# Patient Record
Sex: MALE | Race: WHITE | NOT HISPANIC OR LATINO | Employment: FULL TIME | URBAN - METROPOLITAN AREA
[De-identification: names, ages, dates, MRNs, and addresses within clinical notes are randomized per-mention and may not be internally consistent; named-entity substitution may affect disease eponyms.]

---

## 2017-04-19 ENCOUNTER — TELEPHONE (OUTPATIENT)
Dept: OBSTETRICS AND GYNECOLOGY | Facility: CLINIC | Age: 27
End: 2017-04-19

## 2017-04-19 DIAGNOSIS — Z31.9 INFERTILITY MANAGEMENT: Primary | ICD-10-CM

## 2017-05-10 ENCOUNTER — LAB VISIT (OUTPATIENT)
Dept: LAB | Facility: HOSPITAL | Age: 27
End: 2017-05-10
Attending: OBSTETRICS & GYNECOLOGY
Payer: COMMERCIAL

## 2017-05-10 DIAGNOSIS — Z31.9 INFERTILITY MANAGEMENT: ICD-10-CM

## 2017-05-10 PROCEDURE — 89320 SEMEN ANAL VOL/COUNT/MOT: CPT

## 2017-05-12 LAB
GERM CELLS, SEMEN: ABNORMAL
PH SMN: 8 [PH]
PMN'S/100 SPERMATAZOA: 0 %
SPECIMEN VOL SMN: 5 ML
SPERM # SMN: 300 M
SPERM # SMN: 60 M/ML
SPERM MOTILE NFR SMN: 53 %
SPERM NORM MOTILE # SMN: 6.4 M (ref 50–?)
SPERM NORM NFR SMN: 4 %
SPERM PROG NFR SMN: 51 %
SPERM SMN: ABNORMAL
VISC SMN QL: ABNORMAL
WBC # SMN: 0 M/ML

## 2017-05-15 ENCOUNTER — TELEPHONE (OUTPATIENT)
Dept: OBSTETRICS AND GYNECOLOGY | Facility: CLINIC | Age: 27
End: 2017-05-15

## 2017-05-15 DIAGNOSIS — N46.9 INFERTILITY MALE: Primary | ICD-10-CM

## 2017-05-15 NOTE — TELEPHONE ENCOUNTER
Semen analysis with decreased viscosity and low number of motile sperm.  Please notify.  We typically recommend a repeat semen analysis at this point.  Orders placed.

## 2017-05-17 NOTE — TELEPHONE ENCOUNTER
----- Message from Luz Lopez sent at 5/16/2017  3:25 PM CDT -----  Contact: pt   .Pt was returning nurse call. Pt states to leave a voicemail if he don't answer.   ..[phone] (home)

## 2017-05-17 NOTE — TELEPHONE ENCOUNTER
Spoke with pt, notified of results and recommendations. Pt verbalized understanding.  Placed at .

## 2017-05-25 ENCOUNTER — LAB VISIT (OUTPATIENT)
Dept: LAB | Facility: HOSPITAL | Age: 27
End: 2017-05-25
Attending: OBSTETRICS & GYNECOLOGY
Payer: COMMERCIAL

## 2017-05-25 DIAGNOSIS — N46.9 INFERTILITY MALE: ICD-10-CM

## 2017-05-25 PROCEDURE — 89320 SEMEN ANAL VOL/COUNT/MOT: CPT

## 2017-06-09 LAB
GERM CELLS, SEMEN: ABNORMAL
PH SMN: 8.5 [PH]
PMN'S/100 SPERMATAZOA: 1 %
SPECIMEN VOL SMN: 4 ML
SPERM # SMN: 252 M
SPERM # SMN: 63 M/ML
SPERM MOTILE NFR SMN: 76 %
SPERM NORM MOTILE # SMN: 11.5 M (ref 50–?)
SPERM NORM NFR SMN: 6 %
SPERM PROG NFR SMN: 66 %
SPERM SMN: ABNORMAL
VISC SMN QL: ABNORMAL
WBC # SMN: 0.63 M/ML

## 2017-11-13 ENCOUNTER — TELEPHONE (OUTPATIENT)
Dept: CARDIOLOGY | Facility: CLINIC | Age: 27
End: 2017-11-13

## 2017-11-13 DIAGNOSIS — Q23.1 BICUSPID AORTIC VALVE: Primary | ICD-10-CM

## 2017-11-13 NOTE — TELEPHONE ENCOUNTER
----- Message from Yoandy Briceno sent at 11/13/2017  2:25 PM CST -----  Contact: Estephania ( pt wife )   Estephania ( pt wife )  is requesting a call from nurse to discuss an order for an ecko.            Please call Estephania ( pt wife )  back at [phone]

## 2017-11-21 ENCOUNTER — CLINICAL SUPPORT (OUTPATIENT)
Dept: CARDIOLOGY | Facility: CLINIC | Age: 27
End: 2017-11-21
Payer: COMMERCIAL

## 2017-11-21 ENCOUNTER — CLINICAL SUPPORT (OUTPATIENT)
Dept: CARDIOLOGY | Facility: CLINIC | Age: 27
End: 2017-11-21
Attending: NUCLEAR MEDICINE
Payer: COMMERCIAL

## 2017-11-21 ENCOUNTER — OFFICE VISIT (OUTPATIENT)
Dept: CARDIOLOGY | Facility: CLINIC | Age: 27
End: 2017-11-21
Payer: COMMERCIAL

## 2017-11-21 VITALS
SYSTOLIC BLOOD PRESSURE: 120 MMHG | HEART RATE: 55 BPM | DIASTOLIC BLOOD PRESSURE: 62 MMHG | BODY MASS INDEX: 34.53 KG/M2 | WEIGHT: 220 LBS | HEIGHT: 67 IN

## 2017-11-21 DIAGNOSIS — Q23.1 BICUSPID AORTIC VALVE: Primary | ICD-10-CM

## 2017-11-21 DIAGNOSIS — Q23.1 BICUSPID AORTIC VALVE: ICD-10-CM

## 2017-11-21 LAB
AORTIC VALVE REGURGITATION: ABNORMAL
DIASTOLIC DYSFUNCTION: NO
ESTIMATED PA SYSTOLIC PRESSURE: 37.7
RETIRED EF AND QEF - SEE NOTES: 58 (ref 55–65)
TRICUSPID VALVE REGURGITATION: ABNORMAL

## 2017-11-21 PROCEDURE — 99999 PR PBB SHADOW E&M-EST. PATIENT-LVL III: CPT | Mod: PBBFAC,,, | Performed by: NUCLEAR MEDICINE

## 2017-11-21 PROCEDURE — 93306 TTE W/DOPPLER COMPLETE: CPT | Mod: S$GLB,,, | Performed by: NUCLEAR MEDICINE

## 2017-11-21 PROCEDURE — 99214 OFFICE O/P EST MOD 30 MIN: CPT | Mod: S$GLB,,, | Performed by: NUCLEAR MEDICINE

## 2017-11-21 PROCEDURE — 93000 ELECTROCARDIOGRAM COMPLETE: CPT | Mod: S$GLB,,, | Performed by: INTERNAL MEDICINE

## 2017-11-21 NOTE — PROGRESS NOTES
Subjective:   Patient ID:  Toni Prado is a 27 y.o. male who presents for follow-up of Valvular Heart Disease (Annual followup) and BICUSPID AORTIC VALVE      HPI CONGENITAL BICUSPID AV- MILD AR.  LAST SEEN 2 YRS AGO.- ECHO   PRESENTLY- PHYSICALLY VERY ACTIVE - PLAYING BASKET BALL,  WITHOUT ANY PROBLES  NO UNUSUAL MENDOZA. NO ORTHOPNEA OR PND  NO HX OF PALPITATIONS. NO NEAR SYNCOPE OR SYNCOPE  NO HX OF ANY TYPE OF CHEST DISCOMFORT  NO HX OF EDEMA. CALVE TENDERNESS OR INTERMITTENT CLAUDICATION  NO FOCAL CNS SYMPTOMS OR SIGNS TO SUGGEST TIA OR STROKE  NO HX OF RECURRENT FEVER OR CHILLS    Review of Systems   Constitution: Negative for chills, fever, weakness, night sweats, weight gain and weight loss.   HENT: Negative for nosebleeds.    Eyes: Negative for blurred vision, double vision and visual disturbance.   Cardiovascular: Negative for chest pain, dyspnea on exertion, irregular heartbeat, leg swelling, orthopnea, palpitations, paroxysmal nocturnal dyspnea and syncope.   Respiratory: Negative for cough, hemoptysis and wheezing.    Endocrine: Negative for polydipsia and polyuria.   Hematologic/Lymphatic: Does not bruise/bleed easily.   Skin: Negative for rash.   Musculoskeletal: Negative for joint pain, joint swelling, muscle weakness and myalgias.   Gastrointestinal: Negative for abdominal pain, hematemesis, jaundice and melena.   Genitourinary: Negative for dysuria, hematuria and nocturia.   Neurological: Negative for dizziness, focal weakness, headaches and sensory change.   Psychiatric/Behavioral: Negative for depression. The patient does not have insomnia and is not nervous/anxious.      Family History   Problem Relation Age of Onset    Hypertension Father      Past Medical History:   Diagnosis Date    Heart murmur     Stenosis     Valvular regurgitation     BAV-- MOD AR.     Current Outpatient Prescriptions on File Prior to Visit   Medication Sig Dispense Refill    [DISCONTINUED] naproxen (NAPROSYN)  500 MG tablet Take 1 tablet (500 mg total) by mouth 2 (two) times daily with meals. 20 tablet 0     No current facility-administered medications on file prior to visit.      Review of patient's allergies indicates:  No Known Allergies    Objective:     Physical Exam   Constitutional: He is oriented to person, place, and time. He appears well-developed. No distress.   HENT:   Head: Normocephalic.   Eyes: Conjunctivae are normal. Pupils are equal, round, and reactive to light.   Neck: Neck supple. No JVD present. No thyromegaly present.   Cardiovascular: Normal rate, regular rhythm, normal heart sounds and intact distal pulses.  Exam reveals no gallop and no friction rub.    No murmur heard.  Pulses:       Carotid pulses are 2+ on the right side, and 2+ on the left side.       Radial pulses are 2+ on the right side, and 2+ on the left side.        Femoral pulses are 2+ on the right side, and 2+ on the left side.       Popliteal pulses are 2+ on the right side, and 2+ on the left side.        Dorsalis pedis pulses are 2+ on the right side, and 2+ on the left side.        Posterior tibial pulses are 2+ on the right side, and 2+ on the left side.   Pulmonary/Chest: Breath sounds normal. He has no wheezes. He has no rales. He exhibits no tenderness.   Abdominal: Soft. Bowel sounds are normal. He exhibits no mass. There is no hepatosplenomegaly. There is no tenderness.   Musculoskeletal: He exhibits no edema or tenderness.        Cervical back: Normal.        Thoracic back: Normal.        Lumbar back: Normal.   Lymphadenopathy:     He has no cervical adenopathy.     He has no axillary adenopathy.        Right: No supraclavicular adenopathy present.        Left: No supraclavicular adenopathy present.   Neurological: He is alert and oriented to person, place, and time. He has normal strength. No sensory deficit. Gait normal.   Skin: Skin is warm. No cyanosis. No pallor. Nails show no clubbing.   Psychiatric: He has a normal  mood and affect. His speech is normal and behavior is normal. Cognition and memory are normal.       Assessment:     1. Bicuspid aortic valve      ASYMPTOMATIC  RECENT ECHO-  MILD INCREASE AORTIC ROOT SIZE  FROM 3.6 TO 3.9   LV EF 60%- LV CAVITY NORMAL SIZE- NO VEGETATIONS     MILD TO MODERATE AR.  Plan:     Bicuspid aortic valve    1- RETURN IN 6 MONTHS WITH ECHO

## 2018-05-24 ENCOUNTER — CLINICAL SUPPORT (OUTPATIENT)
Dept: CARDIOLOGY | Facility: CLINIC | Age: 28
End: 2018-05-24
Attending: NUCLEAR MEDICINE
Payer: COMMERCIAL

## 2018-05-24 ENCOUNTER — OFFICE VISIT (OUTPATIENT)
Dept: CARDIOLOGY | Facility: CLINIC | Age: 28
End: 2018-05-24
Payer: COMMERCIAL

## 2018-05-24 VITALS
HEIGHT: 67 IN | DIASTOLIC BLOOD PRESSURE: 60 MMHG | SYSTOLIC BLOOD PRESSURE: 132 MMHG | BODY MASS INDEX: 36.88 KG/M2 | HEART RATE: 56 BPM | WEIGHT: 235 LBS

## 2018-05-24 DIAGNOSIS — Q23.1 BICUSPID AORTIC VALVE: ICD-10-CM

## 2018-05-24 DIAGNOSIS — I35.1 NONRHEUMATIC AORTIC VALVE INSUFFICIENCY: Chronic | ICD-10-CM

## 2018-05-24 DIAGNOSIS — Q23.1 BICUSPID AORTIC VALVE: Primary | ICD-10-CM

## 2018-05-24 LAB
AORTIC VALVE REGURGITATION: NORMAL
DIASTOLIC DYSFUNCTION: NO
ESTIMATED PA SYSTOLIC PRESSURE: 26.23
MITRAL VALVE MOBILITY: NORMAL
RETIRED EF AND QEF - SEE NOTES: 60 (ref 55–65)

## 2018-05-24 PROCEDURE — 3008F BODY MASS INDEX DOCD: CPT | Mod: CPTII,S$GLB,, | Performed by: NUCLEAR MEDICINE

## 2018-05-24 PROCEDURE — 99999 PR PBB SHADOW E&M-EST. PATIENT-LVL III: CPT | Mod: PBBFAC,,, | Performed by: NUCLEAR MEDICINE

## 2018-05-24 PROCEDURE — 93000 ELECTROCARDIOGRAM COMPLETE: CPT | Mod: S$GLB,,, | Performed by: INTERNAL MEDICINE

## 2018-05-24 PROCEDURE — 93306 TTE W/DOPPLER COMPLETE: CPT | Mod: S$GLB,,, | Performed by: NUCLEAR MEDICINE

## 2018-05-24 PROCEDURE — 99214 OFFICE O/P EST MOD 30 MIN: CPT | Mod: S$GLB,,, | Performed by: NUCLEAR MEDICINE

## 2018-05-24 NOTE — PROGRESS NOTES
Subjective:   Patient ID:  Toni Prado is a 28 y.o. male who presents for follow-up of Valvular Heart Disease (Annual with Echo) and CONGENITAL BAV      HPI  CONGENITAL BICUSPID AV - MILD AR.  DOING WELL. ORDINARY AND WORK ACTIVITIES WELL TOLERATED.  PLAYING RECREATIONAL BASKET BALL   WELL TOLERATED  NO UNUSUAL MENDOZA. NO ORTHOPNEA OR PND  NO PALPITATIONS. NO CHEST DISCOMFORT  NO EDEMA. NO CALVE TENDERNESS.   NO INTERMITTENT CLAUDICATION  NO FOCAL CNS SYMPTOMS OR SIGNS TO SUGGEST TIA OR STROKE  ECG -SB 56 , OTHERWISE NORMAL    Review of Systems   Constitution: Negative for chills, fever, weakness, night sweats, weight gain and weight loss.   HENT: Negative for nosebleeds.    Eyes: Negative for blurred vision, double vision and visual disturbance.   Cardiovascular: Negative for chest pain, dyspnea on exertion, irregular heartbeat, leg swelling, orthopnea, palpitations, paroxysmal nocturnal dyspnea and syncope.   Respiratory: Negative for cough, hemoptysis and wheezing.    Endocrine: Negative for polydipsia and polyuria.   Hematologic/Lymphatic: Does not bruise/bleed easily.   Skin: Negative for rash.   Musculoskeletal: Negative for joint pain, joint swelling, muscle weakness and myalgias.   Gastrointestinal: Negative for abdominal pain, hematemesis, jaundice and melena.   Genitourinary: Negative for dysuria, hematuria and nocturia.   Neurological: Negative for dizziness, focal weakness, headaches and sensory change.   Psychiatric/Behavioral: Negative for depression. The patient does not have insomnia and is not nervous/anxious.      Family History   Problem Relation Age of Onset    Hypertension Father      Past Medical History:   Diagnosis Date    Heart murmur     Stenosis     Valvular regurgitation     BAV-- MOD AR.     No current outpatient prescriptions on file prior to visit.     No current facility-administered medications on file prior to visit.      Review of patient's allergies indicates:  No Known  Allergies    Objective:     Physical Exam   Constitutional: He is oriented to person, place, and time. He appears well-developed. No distress.   HENT:   Head: Normocephalic.   Eyes: Conjunctivae are normal. Pupils are equal, round, and reactive to light.   Neck: Neck supple. No JVD present. No thyromegaly present.   Cardiovascular: Normal rate, regular rhythm and intact distal pulses.  Exam reveals no gallop and no friction rub.    Murmur heard.   Low-pitched blowing decrescendo early diastolic murmur is present with a grade of 2/6  at the upper left sternal border, lower left sternal border radiating to the apex  Pulses:       Carotid pulses are 2+ on the right side, and 2+ on the left side.       Radial pulses are 2+ on the right side, and 2+ on the left side.        Femoral pulses are 2+ on the right side, and 2+ on the left side.       Popliteal pulses are 2+ on the right side, and 2+ on the left side.        Dorsalis pedis pulses are 2+ on the right side, and 2+ on the left side.        Posterior tibial pulses are 2+ on the right side, and 2+ on the left side.   Pulmonary/Chest: Breath sounds normal. He has no wheezes. He has no rales. He exhibits no tenderness.   Abdominal: Soft. Bowel sounds are normal. He exhibits no mass. There is no hepatosplenomegaly. There is no tenderness.   Musculoskeletal: He exhibits no edema or tenderness.        Cervical back: Normal.        Thoracic back: Normal.        Lumbar back: Normal.   Lymphadenopathy:     He has no cervical adenopathy.     He has no axillary adenopathy.        Right: No supraclavicular adenopathy present.        Left: No supraclavicular adenopathy present.   Neurological: He is alert and oriented to person, place, and time. He has normal strength. No sensory deficit. Gait normal.   Skin: Skin is warm. No cyanosis. No pallor. Nails show no clubbing.   Psychiatric: He has a normal mood and affect. His speech is normal and behavior is normal. Cognition and  memory are normal.       Assessment:     1. Bicuspid aortic valve      ASYMPTOMATIC CONGENITAL BAV/ MILD MR  ECHO TODAY- ASCENDING AORTA 4 CM  Plan:     Bicuspid aortic valve    1- CONTINUE PRESENT CARD MANAGEMENT    2- RETURN IN 6 MONTHS WITH ECHO

## 2018-12-06 ENCOUNTER — OFFICE VISIT (OUTPATIENT)
Dept: CARDIOLOGY | Facility: CLINIC | Age: 28
End: 2018-12-06
Payer: COMMERCIAL

## 2018-12-06 ENCOUNTER — CLINICAL SUPPORT (OUTPATIENT)
Dept: CARDIOLOGY | Facility: CLINIC | Age: 28
End: 2018-12-06
Attending: NUCLEAR MEDICINE
Payer: COMMERCIAL

## 2018-12-06 VITALS
DIASTOLIC BLOOD PRESSURE: 58 MMHG | HEIGHT: 67 IN | HEART RATE: 64 BPM | SYSTOLIC BLOOD PRESSURE: 120 MMHG | WEIGHT: 230 LBS | BODY MASS INDEX: 36.1 KG/M2

## 2018-12-06 DIAGNOSIS — Q23.1 BICUSPID AORTIC VALVE: ICD-10-CM

## 2018-12-06 DIAGNOSIS — I77.9 AORTIC DISEASE: ICD-10-CM

## 2018-12-06 DIAGNOSIS — Q23.1 BICUSPID AORTIC VALVE: Primary | ICD-10-CM

## 2018-12-06 DIAGNOSIS — I35.1 NONRHEUMATIC AORTIC VALVE INSUFFICIENCY: Chronic | ICD-10-CM

## 2018-12-06 DIAGNOSIS — Z01.89 ENCOUNTER FOR ROUTINE LABORATORY TESTING: Primary | ICD-10-CM

## 2018-12-06 PROCEDURE — 93306 TTE W/DOPPLER COMPLETE: CPT | Mod: S$GLB,,, | Performed by: INTERNAL MEDICINE

## 2018-12-06 PROCEDURE — 99214 OFFICE O/P EST MOD 30 MIN: CPT | Mod: S$GLB,,, | Performed by: NUCLEAR MEDICINE

## 2018-12-06 PROCEDURE — 99999 PR PBB SHADOW E&M-EST. PATIENT-LVL III: CPT | Mod: PBBFAC,,, | Performed by: NUCLEAR MEDICINE

## 2018-12-06 PROCEDURE — 3008F BODY MASS INDEX DOCD: CPT | Mod: CPTII,S$GLB,, | Performed by: NUCLEAR MEDICINE

## 2018-12-06 NOTE — PROGRESS NOTES
Subjective:   Patient ID:  Toni Prado is a 28 y.o. male who presents for follow-up of Valvular Heart Disease (BAV.)      HPI1-CONGENITAL BAV-MILD AR.  DOING WELL.NO PALPITATIONS.  NO UNUSUAL MENDOZA.NO ORTHOPNEA OR PND  NO CHEST DISCOMFORT  NO UNUSUAL FEVER OR CHILLS  NO EDEMA. NO CALVE TENDERNESS. NO INTERMITTENT CLAUDICATION  NO FOCAL CNS SYMPTOMS OR SIGNS TO SUGGEST TIA OR STROKE  ECHO DONE TODAY-  ASCENDING AORTA 4.3 CM, INCREASED FROM 4 CM 7 MONTHS AGO    Review of Systems   Constitution: Negative for chills, fever, weakness, night sweats, weight gain and weight loss.   HENT: Negative for nosebleeds.    Eyes: Negative for blurred vision, double vision and visual disturbance.   Cardiovascular: Negative for chest pain, dyspnea on exertion, irregular heartbeat, leg swelling, orthopnea, palpitations, paroxysmal nocturnal dyspnea and syncope.   Respiratory: Negative for cough, hemoptysis and wheezing.    Endocrine: Negative for polydipsia and polyuria.   Hematologic/Lymphatic: Does not bruise/bleed easily.   Skin: Negative for rash.   Musculoskeletal: Negative for joint pain, joint swelling, muscle weakness and myalgias.   Gastrointestinal: Negative for abdominal pain, hematemesis, jaundice and melena.   Genitourinary: Negative for dysuria, hematuria and nocturia.   Neurological: Negative for dizziness, focal weakness, headaches and sensory change.   Psychiatric/Behavioral: Negative for depression. The patient does not have insomnia and is not nervous/anxious.      Family History   Problem Relation Age of Onset    Hypertension Father      Past Medical History:   Diagnosis Date    Heart murmur     Stenosis     Valvular regurgitation     BAV-- MOD AR.     No current outpatient medications on file prior to visit.     No current facility-administered medications on file prior to visit.      Review of patient's allergies indicates:  No Known Allergies    Objective:     Physical Exam   Constitutional: He is  oriented to person, place, and time. He appears well-developed. No distress.   HENT:   Head: Normocephalic.   Eyes: Conjunctivae are normal. Pupils are equal, round, and reactive to light.   Neck: Neck supple. No JVD present. No thyromegaly present.   Cardiovascular: Normal rate, regular rhythm and intact distal pulses. Exam reveals no gallop and no friction rub.   Murmur heard.   Medium-pitched blowing decrescendo early diastolic murmur is present with a grade of 2/6 at the upper left sternal border and lower left sternal border. The intensity increases with respiration.  Pulses:       Carotid pulses are 2+ on the right side, and 2+ on the left side.       Radial pulses are 2+ on the right side, and 2+ on the left side.        Femoral pulses are 2+ on the right side, and 2+ on the left side.       Popliteal pulses are 2+ on the right side, and 2+ on the left side.        Dorsalis pedis pulses are 2+ on the right side, and 2+ on the left side.        Posterior tibial pulses are 2+ on the right side, and 2+ on the left side.   Pulmonary/Chest: Breath sounds normal. He has no wheezes. He has no rales. He exhibits no tenderness.   Abdominal: Soft. Bowel sounds are normal. He exhibits no mass. There is no hepatosplenomegaly. There is no tenderness.   Musculoskeletal: He exhibits no edema or tenderness.        Cervical back: Normal.        Thoracic back: Normal.        Lumbar back: Normal.   Lymphadenopathy:     He has no cervical adenopathy.     He has no axillary adenopathy.        Right: No supraclavicular adenopathy present.        Left: No supraclavicular adenopathy present.   Neurological: He is alert and oriented to person, place, and time. He has normal strength. No sensory deficit. Gait normal.   Skin: Skin is warm. No cyanosis. No pallor. Nails show no clubbing.   Psychiatric: He has a normal mood and affect. His speech is normal and behavior is normal. Cognition and memory are normal.       Assessment:     1.  Bicuspid aortic valve    2. Nonrheumatic aortic valve insufficiency      ASYMPTOMATIC  MILDLY INCREASE ASCENDING AORTA DILATATION  Plan:     Bicuspid aortic valve    Nonrheumatic aortic valve insufficiency      1- SCHEDULE CTA OF THORACIC AORTA-  EVALUATION OF ASCENDING AORTA- /BISCUSPID AORTIC VALVE    2- PHONE CALL TO REVIEW RESULTS

## 2018-12-07 ENCOUNTER — PATIENT MESSAGE (OUTPATIENT)
Dept: CARDIOLOGY | Facility: CLINIC | Age: 28
End: 2018-12-07

## 2018-12-07 ENCOUNTER — TELEPHONE (OUTPATIENT)
Dept: RADIOLOGY | Facility: HOSPITAL | Age: 28
End: 2018-12-07

## 2018-12-07 DIAGNOSIS — I77.9 AORTIC DISEASE: ICD-10-CM

## 2018-12-09 LAB
AORTIC VALVE REGURGITATION: ABNORMAL
DIASTOLIC DYSFUNCTION: NO
ESTIMATED PA SYSTOLIC PRESSURE: 25.2
RETIRED EF AND QEF - SEE NOTES: 60 (ref 55–65)

## 2018-12-10 ENCOUNTER — TELEPHONE (OUTPATIENT)
Dept: CARDIOLOGY | Facility: CLINIC | Age: 28
End: 2018-12-10

## 2018-12-10 NOTE — TELEPHONE ENCOUNTER
----- Message from Cristi Duncan sent at 12/10/2018 10:30 AM CST -----  Contact: pt   States he's calling to see if th angiogram is still needed/ states it was discussed in his appt when he was last in and can be reached at [phone].//thanks/dbw

## 2018-12-10 NOTE — TELEPHONE ENCOUNTER
Returned call. Explained CTA scheduled and to arrive 30 minutes early for labs.    Patient verbalized understanding.

## 2018-12-11 ENCOUNTER — TELEPHONE (OUTPATIENT)
Dept: CARDIOLOGY | Facility: CLINIC | Age: 28
End: 2018-12-11

## 2018-12-11 NOTE — TELEPHONE ENCOUNTER
----- Message from Tash Parra, RT sent at 12/11/2018 10:29 AM CST -----  Regarding: CT order question  CTA chest and abdomen ordered for to evaluate aorta ascending aorta. I am confirming the  wants the abdominal aorta. If not, CTA chest only needs to be ordered. Please call with any questions.   Thanks,   Tash Parra  [phone]

## 2018-12-12 ENCOUNTER — HOSPITAL ENCOUNTER (OUTPATIENT)
Dept: RADIOLOGY | Facility: HOSPITAL | Age: 28
Discharge: HOME | End: 2018-12-12
Attending: NUCLEAR MEDICINE
Payer: COMMERCIAL

## 2018-12-12 DIAGNOSIS — I77.9 AORTIC DISEASE: ICD-10-CM

## 2018-12-12 PROCEDURE — 71275 CT ANGIOGRAPHY CHEST: CPT | Mod: 26,,, | Performed by: RADIOLOGY

## 2018-12-12 PROCEDURE — 71275 CT ANGIOGRAPHY CHEST: CPT | Mod: TC,PO

## 2018-12-12 PROCEDURE — 25500020 PHARM REV CODE 255: Mod: PO | Performed by: NUCLEAR MEDICINE

## 2018-12-12 RX ADMIN — IOHEXOL 100 ML: 350 INJECTION, SOLUTION INTRAVENOUS at 11:12

## 2019-03-22 ENCOUNTER — TELEPHONE (OUTPATIENT)
Dept: CARDIOLOGY | Facility: CLINIC | Age: 29
End: 2019-03-22

## 2019-03-22 NOTE — TELEPHONE ENCOUNTER
----- Message from Joshua Lara sent at 3/22/2019  2:54 PM CDT -----  Contact: Nohemi ([employer])   Please give Nohemi a call at [phone] she is calling to check the status of the request for medical records.

## 2019-03-22 NOTE — TELEPHONE ENCOUNTER
Returned call. Requesting complete chart. Provided phone number and fax numbers to HIM, since requesting all medical records.

## 2019-03-26 ENCOUNTER — TELEPHONE (OUTPATIENT)
Dept: CARDIOLOGY | Facility: CLINIC | Age: 29
End: 2019-03-26

## 2019-03-26 NOTE — TELEPHONE ENCOUNTER
----- Message from Denise Retana sent at 3/26/2019  9:02 AM CDT -----  [phone]   Would like a nurse to contact him regarding request for dakotah's statement, please contact him @ [phone]. Thanks

## 2019-04-01 ENCOUNTER — OFFICE VISIT (OUTPATIENT)
Dept: URGENT CARE | Facility: CLINIC | Age: 29
End: 2019-04-01
Payer: COMMERCIAL

## 2019-04-01 VITALS
OXYGEN SATURATION: 100 % | SYSTOLIC BLOOD PRESSURE: 128 MMHG | DIASTOLIC BLOOD PRESSURE: 78 MMHG | HEART RATE: 97 BPM | HEIGHT: 67 IN | BODY MASS INDEX: 36.88 KG/M2 | TEMPERATURE: 99 F | WEIGHT: 235 LBS | RESPIRATION RATE: 16 BRPM

## 2019-04-01 DIAGNOSIS — J06.9 UPPER RESPIRATORY TRACT INFECTION, UNSPECIFIED TYPE: Primary | ICD-10-CM

## 2019-04-01 DIAGNOSIS — R68.89 FLU-LIKE SYMPTOMS: ICD-10-CM

## 2019-04-01 LAB
CTP QC/QA: YES
POC MOLECULAR INFLUENZA A AGN: NEGATIVE
POC MOLECULAR INFLUENZA B AGN: NEGATIVE

## 2019-04-01 PROCEDURE — 87502 INFLUENZA DNA AMP PROBE: CPT | Mod: QW,S$GLB,, | Performed by: NURSE PRACTITIONER

## 2019-04-01 PROCEDURE — 99999 PR PBB SHADOW E&M-EST. PATIENT-LVL III: ICD-10-PCS | Mod: PBBFAC,,, | Performed by: NURSE PRACTITIONER

## 2019-04-01 PROCEDURE — 99214 PR OFFICE/OUTPT VISIT, EST, LEVL IV, 30-39 MIN: ICD-10-PCS | Mod: S$GLB,,, | Performed by: NURSE PRACTITIONER

## 2019-04-01 PROCEDURE — 3008F PR BODY MASS INDEX (BMI) DOCUMENTED: ICD-10-PCS | Mod: CPTII,S$GLB,, | Performed by: NURSE PRACTITIONER

## 2019-04-01 PROCEDURE — 3008F BODY MASS INDEX DOCD: CPT | Mod: CPTII,S$GLB,, | Performed by: NURSE PRACTITIONER

## 2019-04-01 PROCEDURE — 99214 OFFICE O/P EST MOD 30 MIN: CPT | Mod: S$GLB,,, | Performed by: NURSE PRACTITIONER

## 2019-04-01 PROCEDURE — 99999 PR PBB SHADOW E&M-EST. PATIENT-LVL III: CPT | Mod: PBBFAC,,, | Performed by: NURSE PRACTITIONER

## 2019-04-01 PROCEDURE — 87502 POCT INFLUENZA A/B MOLECULAR: ICD-10-PCS | Mod: QW,S$GLB,, | Performed by: NURSE PRACTITIONER

## 2019-04-02 NOTE — PROGRESS NOTES
Subjective:      Patient ID: Toni Prado is a 29 y.o. male.    Chief Complaint: Cough (post nasal drip, scratchy throat x 2 days)      Sore Throat    This is a new problem. Episode onset: 2 days. The problem has been unchanged. There has been no fever. The pain is at a severity of 0/10. The patient is experiencing no pain. Associated symptoms include congestion, coughing and neck pain. Pertinent negatives include no abdominal pain, diarrhea, ear discharge, ear pain, headaches, shortness of breath or vomiting. Stridor:    Associated symptoms comments: Sweating, fatigue. He has had no exposure to strep or mono. Treatments tried: nyquil, dayquil, mucinex, ibuprofen.     Review of Systems   Constitutional: Positive for diaphoresis and fatigue. Negative for activity change, appetite change, chills, fever and unexpected weight change.   HENT: Positive for congestion and sore throat. Negative for ear discharge, ear pain, postnasal drip, rhinorrhea, sinus pressure, sinus pain and sneezing.    Eyes: Negative.    Respiratory: Positive for cough. Negative for chest tightness, shortness of breath and wheezing. Stridor:       Cardiovascular: Negative for chest pain and palpitations.   Gastrointestinal: Negative for abdominal pain, diarrhea, nausea and vomiting.   Endocrine: Positive for heat intolerance.   Genitourinary: Negative.    Musculoskeletal: Positive for neck pain. Negative for arthralgias and myalgias.   Skin: Negative for rash.   Allergic/Immunologic: Negative for environmental allergies and food allergies.   Neurological: Negative for dizziness, weakness, light-headedness and headaches.   Hematological: Negative for adenopathy.   Psychiatric/Behavioral: Negative for agitation.        Objective:     Vitals:    04/01/19 1901   BP: 128/78   Pulse: 97   Resp: 16   Temp: 99 °F (37.2 °C)     Physical Exam   Constitutional: He is oriented to person, place, and time. Vital signs are normal. He appears  well-developed and well-nourished. He is cooperative. No distress.   HENT:   Head: Normocephalic.   Right Ear: Hearing, external ear and ear canal normal.   Left Ear: Hearing, tympanic membrane, external ear and ear canal normal.   Nose: Nose normal. Right sinus exhibits no maxillary sinus tenderness and no frontal sinus tenderness. Left sinus exhibits no maxillary sinus tenderness and no frontal sinus tenderness.   Mouth/Throat: Uvula is midline and mucous membranes are normal. No oral lesions. No uvula swelling. Posterior oropharyngeal erythema (mild) present. No oropharyngeal exudate, posterior oropharyngeal edema or tonsillar abscesses.   Slight dull appearance to right TM   Eyes: Pupils are equal, round, and reactive to light. Conjunctivae, EOM and lids are normal. Right eye exhibits no discharge. Left eye exhibits no discharge.   Neck: Normal range of motion and full passive range of motion without pain. Neck supple.   Cardiovascular: Normal rate, regular rhythm and normal heart sounds.   Pulmonary/Chest: Effort normal and breath sounds normal. No accessory muscle usage. No tachypnea and no bradypnea. No respiratory distress.   Musculoskeletal: Normal range of motion.   Lymphadenopathy:        Head (right side): No submandibular and no tonsillar adenopathy present.        Head (left side): No submandibular and no tonsillar adenopathy present.     He has no cervical adenopathy.   Neurological: He is alert and oriented to person, place, and time.   Skin: Skin is warm and intact. Capillary refill takes less than 2 seconds. No bruising, no ecchymosis, no lesion, no petechiae and no rash noted. He is diaphoretic (mild). No erythema. No pallor.   Nursing note and vitals reviewed.      Assessment:     1. Flu-like symptoms    2. Upper respiratory tract infection, unspecified type        Plan:     Toni was seen today for cough.    Diagnoses and all orders for this visit:    Upper respiratory tract infection,  unspecified type    Flu-like symptoms  -     POCT Influenza A/B Molecular    Flu swab negative today  · Your symptoms are likely due to a viral infection. These infections can last up to 14 days, but you should notice some improvement of your symptoms within the first 7-10 days. Viral infections will not improve with antibiotics. If your symptoms persist >10 days without improvement or if you have any new or worsening symptoms, this is an indication that you may have developed a bacterial infection and should return to your primary care provider or to Urgent Care.   · Getting plenty of rest is very important to fighting infections.  · Increase fluids.   · May apply warm compresses as needed for facial pain and congestion.   · Saline nasal spray to loosen nasal congestion.  · Flonase or Nasacort to reduce inflammation in the sinus cavities.  · You may take an over the counter antihistamine for allergy symptoms such as sneezing, itchy/watery eyes, scratchy throat, or congestion.  · Take Tylenol or Ibuprofen as needed for sore throat, body aches, or fever.  · Don't drive, drink alcohol, or take any other medication or substance that causes sedation while taking cough syrup.   · Follow up with your primary care provider if symptoms persist >10 days or sooner for any new or worsening symptoms.   Go to the ER for any fever that does not improve with Tylenol/Ibuprofen, neck stiffness, rash, severe headache, vision changes, shortness of breath, chest pain, facial swelling, severe facial pain, or any other new and concerning symptoms.        KASIA Larsen, FNP-C

## 2019-04-02 NOTE — PATIENT INSTRUCTIONS
Viral Upper Respiratory Illness (Adult)  You have a viral upper respiratory illness (URI), which is another term for the common cold. This illness is contagious during the first few days. It is spread through the air by coughing and sneezing. It may also be spread by direct contact (touching the sick person and then touching your own eyes, nose, or mouth). Frequent handwashing will decrease risk of spread. Most viral illnesses go away within 7 to 10 days with rest and simple home remedies. Sometimes the illness may last for several weeks. Antibiotics will not kill a virus, and they are generally not prescribed for this condition.    Home care  · If symptoms are severe, rest at home for the first 2 to 3 days. When you resume activity, don't let yourself get too tired.  · Avoid being exposed to cigarette smoke (yours or others).  · You may use acetaminophen or ibuprofen to control pain and fever, unless another medicine was prescribed. (Note: If you have chronic liver or kidney disease, have ever had a stomach ulcer or gastrointestinal bleeding, or are taking blood-thinning medicines, talk with your healthcare provider before using these medicines.) Aspirin should never be given to anyone under 18 years of age who is ill with a viral infection or fever. It may cause severe liver or brain damage.  · Your appetite may be poor, so a light diet is fine. Avoid dehydration by drinking 6 to 8 glasses of fluids per day (water, soft drinks, juices, tea, or soup). Extra fluids will help loosen secretions in the nose and lungs.  · Over-the-counter cold medicines will not shorten the length of time youre sick, but they may be helpful for the following symptoms: cough, sore throat, and nasal and sinus congestion. (Note: Do not use decongestants if you have high blood pressure.)  Follow-up care  Follow up with your healthcare provider, or as advised.  When to seek medical advice  Call your healthcare provider right away if any  of these occur:  · Cough with lots of colored sputum (mucus)  · Severe headache; face, neck, or ear pain  · Difficulty swallowing due to throat pain  · Fever of 100.4°F (38°C)  Call 911, or get immediate medical care  Call emergency services right away if any of these occur:  · Chest pain, shortness of breath, wheezing, or difficulty breathing  · Coughing up blood  · Inability to swallow due to throat pain  Date Last Reviewed: 9/13/2015  © 6882-7271 [employer]. [address] [place] [id number]. All rights reserved. This information is not intended as a substitute for professional medical care. Always follow your healthcare professional's instructions.

## 2019-04-03 ENCOUNTER — TELEPHONE (OUTPATIENT)
Dept: CARDIOLOGY | Facility: CLINIC | Age: 29
End: 2019-04-03

## 2019-04-03 NOTE — TELEPHONE ENCOUNTER
Returned call and informed Donya request has been faxed to HIM.  Also provided a phone number and fax number to HIM. HIM awaiting fax.

## 2019-04-03 NOTE — TELEPHONE ENCOUNTER
----- Message from Elina Longoria sent at 4/3/2019  3:25 PM CDT -----  Contact: See- [employer] [facility]-[phone]    Would Like to verify if fax was received form [employer] and would like for Mrs. Andres to call back, please call back at [phone].   x-AH

## 2019-11-12 ENCOUNTER — PATIENT MESSAGE (OUTPATIENT)
Dept: CARDIOLOGY | Facility: CLINIC | Age: 29
End: 2019-11-12

## 2019-11-13 NOTE — TELEPHONE ENCOUNTER
Dr. RUVALCABA is planning on opening his schedule on 11/21 and said we can schedule [facility]izz[facility]lett[facility] on that day. I am waiting for it to be open on epic before following up with the pt.

## 2019-11-18 ENCOUNTER — TELEPHONE (OUTPATIENT)
Dept: RADIOLOGY | Facility: HOSPITAL | Age: 29
End: 2019-11-18

## 2019-11-19 ENCOUNTER — HOSPITAL ENCOUNTER (OUTPATIENT)
Dept: RADIOLOGY | Facility: HOSPITAL | Age: 29
Discharge: HOME | End: 2019-11-19
Attending: NUCLEAR MEDICINE
Payer: COMMERCIAL

## 2019-11-19 DIAGNOSIS — I35.1 NONRHEUMATIC AORTIC VALVE INSUFFICIENCY: Primary | Chronic | ICD-10-CM

## 2019-11-19 DIAGNOSIS — I77.9 AORTIC DISEASE: ICD-10-CM

## 2019-11-19 PROCEDURE — 71275 CTA CHEST NON CORONARY: ICD-10-PCS | Mod: 26,,, | Performed by: RADIOLOGY

## 2019-11-19 PROCEDURE — 71275 CT ANGIOGRAPHY CHEST: CPT | Mod: TC

## 2019-11-19 PROCEDURE — 71275 CT ANGIOGRAPHY CHEST: CPT | Mod: 26,,, | Performed by: RADIOLOGY

## 2019-11-19 PROCEDURE — 25500020 PHARM REV CODE 255: Performed by: NUCLEAR MEDICINE

## 2019-11-19 RX ADMIN — IOHEXOL 100 ML: 350 INJECTION, SOLUTION INTRAVENOUS at 01:11

## 2019-11-21 ENCOUNTER — OFFICE VISIT (OUTPATIENT)
Dept: CARDIOLOGY | Facility: CLINIC | Age: 29
End: 2019-11-21
Payer: COMMERCIAL

## 2019-11-21 ENCOUNTER — CLINICAL SUPPORT (OUTPATIENT)
Dept: CARDIOLOGY | Facility: CLINIC | Age: 29
End: 2019-11-21
Payer: COMMERCIAL

## 2019-11-21 VITALS
DIASTOLIC BLOOD PRESSURE: 64 MMHG | HEART RATE: 56 BPM | SYSTOLIC BLOOD PRESSURE: 122 MMHG | WEIGHT: 247.81 LBS | BODY MASS INDEX: 38.89 KG/M2 | HEIGHT: 67 IN

## 2019-11-21 DIAGNOSIS — I35.1 NONRHEUMATIC AORTIC VALVE INSUFFICIENCY: Chronic | ICD-10-CM

## 2019-11-21 DIAGNOSIS — I71.20 THORACIC AORTIC ANEURYSM WITHOUT RUPTURE: Chronic | ICD-10-CM

## 2019-11-21 DIAGNOSIS — Q23.1 BICUSPID AORTIC VALVE: Primary | ICD-10-CM

## 2019-11-21 PROCEDURE — 3008F BODY MASS INDEX DOCD: CPT | Mod: CPTII,S$GLB,, | Performed by: NUCLEAR MEDICINE

## 2019-11-21 PROCEDURE — 99214 OFFICE O/P EST MOD 30 MIN: CPT | Mod: S$GLB,,, | Performed by: NUCLEAR MEDICINE

## 2019-11-21 PROCEDURE — 99214 PR OFFICE/OUTPT VISIT, EST, LEVL IV, 30-39 MIN: ICD-10-PCS | Mod: S$GLB,,, | Performed by: NUCLEAR MEDICINE

## 2019-11-21 PROCEDURE — 93005 ELECTROCARDIOGRAM TRACING: CPT | Mod: S$GLB,,, | Performed by: NUCLEAR MEDICINE

## 2019-11-21 PROCEDURE — 99999 PR PBB SHADOW E&M-EST. PATIENT-LVL III: ICD-10-PCS | Mod: PBBFAC,,, | Performed by: NUCLEAR MEDICINE

## 2019-11-21 PROCEDURE — 93005 EKG 12-LEAD: ICD-10-PCS | Mod: S$GLB,,, | Performed by: NUCLEAR MEDICINE

## 2019-11-21 PROCEDURE — 99999 PR PBB SHADOW E&M-EST. PATIENT-LVL III: CPT | Mod: PBBFAC,,, | Performed by: NUCLEAR MEDICINE

## 2019-11-21 PROCEDURE — 3008F PR BODY MASS INDEX (BMI) DOCUMENTED: ICD-10-PCS | Mod: CPTII,S$GLB,, | Performed by: NUCLEAR MEDICINE

## 2019-11-21 PROCEDURE — 93010 ELECTROCARDIOGRAM REPORT: CPT | Mod: S$GLB,,, | Performed by: INTERNAL MEDICINE

## 2019-11-21 PROCEDURE — 93010 EKG 12-LEAD: ICD-10-PCS | Mod: S$GLB,,, | Performed by: INTERNAL MEDICINE

## 2019-11-21 NOTE — PROGRESS NOTES
Subjective:   Patient ID:  Toni Prado is a 29 y.o. male who presents for follow-up of Follow-up (results//annual) and Valvular Heart Disease (BAV- TAA)      HPI RECENT  CT SCAN OF CHEST WAS REVIEWED AND DISCUSSED WITH PT- TAA - 4.3 CM-UNCHANGED SINCE PREVIOUS STUDY 12/200 8  DOING WELL. NO CHEST OR BACK DISCOMFORT  NO UNUSUAL MENDOZA. NO ORTHOPNEA OR PND  NO PALPITATIONS  NO NEAR SYNCOPE OR SYNCOPE  NO EDEMA. NO CALVE TENDERNESS  NO FOCAL CNS SYMPTOMS OR SIGNS TO SUGGEST TIA OR STROKE  NO ABDOMINAL PULSATIONS OR PAIN  ECG TODAY, SB 57 NO ACUTE ST T CHANGES    Review of Systems   Constitution: Negative for chills, fever, night sweats, weight gain and weight loss.   HENT: Negative for nosebleeds.    Eyes: Negative for blurred vision, double vision and visual disturbance.   Cardiovascular: Negative for chest pain, dyspnea on exertion, irregular heartbeat, leg swelling, orthopnea, palpitations, paroxysmal nocturnal dyspnea and syncope.   Respiratory: Negative for cough, hemoptysis and wheezing.    Endocrine: Negative for polydipsia and polyuria.   Hematologic/Lymphatic: Does not bruise/bleed easily.   Skin: Negative for rash.   Musculoskeletal: Negative for joint pain, joint swelling, muscle weakness and myalgias.   Gastrointestinal: Negative for abdominal pain, hematemesis, jaundice and melena.   Genitourinary: Negative for dysuria, hematuria and nocturia.   Neurological: Negative for dizziness, focal weakness, headaches, sensory change and weakness.   Psychiatric/Behavioral: Negative for depression. The patient does not have insomnia and is not nervous/anxious.      Family History   Problem Relation Age of Onset    Hypertension Father      Past Medical History:   Diagnosis Date    Heart murmur     Stenosis     Valvular regurgitation     BAV-- MOD AR.     Social History     Socioeconomic History    Marital status:      Spouse name: Not on file    Number of children: Not on file    Years of  education: Not on file    Highest education level: Not on file   Occupational History    Not on file   Social Needs    Financial resource strain: Not on file    Food insecurity:     Worry: Not on file     Inability: Not on file    Transportation needs:     Medical: Not on file     Non-medical: Not on file   Tobacco Use    Smoking status: Never Smoker    Smokeless tobacco: Never Used   Substance and Sexual Activity    Alcohol use: Yes     Comment: occassionally    Drug use: No    Sexual activity: Yes     Partners: Female   Lifestyle    Physical activity:     Days per week: Not on file     Minutes per session: Not on file    Stress: Not on file   Relationships    Social connections:     Talks on phone: Not on file     Gets together: Not on file     Attends Synagogue service: Not on file     Active member of club or organization: Not on file     Attends meetings of clubs or organizations: Not on file     Relationship status: Not on file   Other Topics Concern    Not on file   Social History Narrative    Not on file     No current outpatient medications on file prior to visit.     No current facility-administered medications on file prior to visit.      Review of patient's allergies indicates:  No Known Allergies    Objective:     Physical Exam   Constitutional: He is oriented to person, place, and time. He appears well-developed. No distress.   HENT:   Head: Normocephalic.   Eyes: Pupils are equal, round, and reactive to light. Conjunctivae are normal.   Neck: Neck supple. No JVD present. No thyromegaly present.   Cardiovascular: Normal rate, regular rhythm and intact distal pulses. Exam reveals no gallop and no friction rub.   Murmur heard.   Low-pitched blowing decrescendo early diastolic murmur is present with a grade of 2/6 at the upper left sternal border and lower left sternal border radiating to the apex. The intensity increases with respiration.  Pulses:       Carotid pulses are 2+ on the right  side, and 2+ on the left side.       Radial pulses are 2+ on the right side, and 2+ on the left side.        Femoral pulses are 2+ on the right side, and 2+ on the left side.       Popliteal pulses are 2+ on the right side, and 2+ on the left side.        Dorsalis pedis pulses are 2+ on the right side, and 2+ on the left side.        Posterior tibial pulses are 2+ on the right side, and 2+ on the left side.   Pulmonary/Chest: Breath sounds normal. He has no wheezes. He has no rales. He exhibits no tenderness.   Abdominal: Soft. Bowel sounds are normal. He exhibits no mass. There is no hepatosplenomegaly. There is no tenderness.   Musculoskeletal: He exhibits no edema or tenderness.        Cervical back: Normal.        Thoracic back: Normal.        Lumbar back: Normal.   Lymphadenopathy:     He has no cervical adenopathy.     He has no axillary adenopathy.        Right: No supraclavicular adenopathy present.        Left: No supraclavicular adenopathy present.   Neurological: He is alert and oriented to person, place, and time. He has normal strength. No sensory deficit. Gait normal.   Skin: Skin is warm. No cyanosis. No pallor. Nails show no clubbing.   Psychiatric: He has a normal mood and affect. His speech is normal and behavior is normal. Cognition and memory are normal.       Assessment:     1. Bicuspid aortic valve    2. Nonrheumatic aortic valve insufficiency    3. Thoracic aortic aneurysm without rupture        Plan:     Bicuspid aortic valve  STABLE-- ASYMPTOMATIC  -     2D echo with color flow doppler; Future    Nonrheumatic aortic valve insufficiency  QUALITY OF MURMUR UNCHANGED  AYSMPTOMATIC  -     2D echo with color flow doppler; Future    Thoracic aortic aneurysm without rupture  STABLE - ASYMPTOMATIC  -     CTA CHEST NON CORONARY; Future; Expected date: 11/21/2019      RETURN IN ONE YEAR

## 2019-12-11 ENCOUNTER — PATIENT MESSAGE (OUTPATIENT)
Dept: CARDIOLOGY | Facility: CLINIC | Age: 29
End: 2019-12-11

## 2020-03-18 ENCOUNTER — PATIENT MESSAGE (OUTPATIENT)
Dept: CARDIOLOGY | Facility: CLINIC | Age: 30
End: 2020-03-18

## 2020-06-23 ENCOUNTER — LAB VISIT (OUTPATIENT)
Dept: PRIMARY CARE CLINIC | Facility: OTHER | Age: 30
End: 2020-06-23
Payer: COMMERCIAL

## 2020-06-23 DIAGNOSIS — Z03.818 ENCNTR FOR OBS FOR SUSP EXPSR TO OTH BIOLG AGENTS RULED OUT: ICD-10-CM

## 2020-06-23 PROCEDURE — U0003 INFECTIOUS AGENT DETECTION BY NUCLEIC ACID (DNA OR RNA); SEVERE ACUTE RESPIRATORY SYNDROME CORONAVIRUS 2 (SARS-COV-2) (CORONAVIRUS DISEASE [COVID-19]), AMPLIFIED PROBE TECHNIQUE, MAKING USE OF HIGH THROUGHPUT TECHNOLOGIES AS DESCRIBED BY CMS-2020-01-R: HCPCS

## 2020-06-26 LAB — SARS-COV-2 RNA RESP QL NAA+PROBE: NOT DETECTED

## 2020-12-03 ENCOUNTER — PATIENT MESSAGE (OUTPATIENT)
Dept: CARDIOLOGY | Facility: CLINIC | Age: 30
End: 2020-12-03

## 2020-12-04 DIAGNOSIS — I35.8 OTHER NONRHEUMATIC AORTIC VALVE DISORDERS: ICD-10-CM

## 2020-12-04 DIAGNOSIS — I71.20 THORACIC AORTIC ANEURYSM, WITHOUT RUPTURE: ICD-10-CM

## 2020-12-07 DIAGNOSIS — Z00.00 ROUTINE CHECK-UP: Primary | ICD-10-CM

## 2020-12-14 ENCOUNTER — TELEPHONE (OUTPATIENT)
Dept: RADIOLOGY | Facility: HOSPITAL | Age: 30
End: 2020-12-14

## 2020-12-15 ENCOUNTER — HOSPITAL ENCOUNTER (OUTPATIENT)
Dept: RADIOLOGY | Facility: HOSPITAL | Age: 30
Discharge: HOME | End: 2020-12-15
Attending: NUCLEAR MEDICINE
Payer: COMMERCIAL

## 2020-12-15 DIAGNOSIS — I35.8 OTHER NONRHEUMATIC AORTIC VALVE DISORDERS: ICD-10-CM

## 2020-12-15 DIAGNOSIS — I71.20 THORACIC AORTIC ANEURYSM, WITHOUT RUPTURE: ICD-10-CM

## 2020-12-15 PROCEDURE — 71275 CT ANGIOGRAPHY CHEST: CPT | Mod: TC

## 2020-12-15 PROCEDURE — 71275 CTA CHEST NON CORONARY: ICD-10-PCS | Mod: 26,,, | Performed by: RADIOLOGY

## 2020-12-15 PROCEDURE — 25500020 PHARM REV CODE 255: Performed by: NUCLEAR MEDICINE

## 2020-12-15 PROCEDURE — 71275 CT ANGIOGRAPHY CHEST: CPT | Mod: 26,,, | Performed by: RADIOLOGY

## 2020-12-15 RX ADMIN — IOHEXOL 100 ML: 350 INJECTION, SOLUTION INTRAVENOUS at 09:12

## 2020-12-21 ENCOUNTER — PATIENT MESSAGE (OUTPATIENT)
Dept: TRANSPLANT | Facility: CLINIC | Age: 30
End: 2020-12-21

## 2020-12-21 ENCOUNTER — OFFICE VISIT (OUTPATIENT)
Dept: TRANSPLANT | Facility: CLINIC | Age: 30
End: 2020-12-21
Payer: COMMERCIAL

## 2020-12-21 ENCOUNTER — HOSPITAL ENCOUNTER (OUTPATIENT)
Dept: CARDIOLOGY | Facility: HOSPITAL | Age: 30
Discharge: HOME | End: 2020-12-21
Attending: NUCLEAR MEDICINE
Payer: COMMERCIAL

## 2020-12-21 VITALS
DIASTOLIC BLOOD PRESSURE: 68 MMHG | OXYGEN SATURATION: 99 % | HEART RATE: 65 BPM | WEIGHT: 242.5 LBS | SYSTOLIC BLOOD PRESSURE: 138 MMHG | BODY MASS INDEX: 38.06 KG/M2 | HEIGHT: 67 IN

## 2020-12-21 DIAGNOSIS — Z00.00 ROUTINE CHECK-UP: ICD-10-CM

## 2020-12-21 DIAGNOSIS — Q23.1 BICUSPID AORTIC VALVE: Primary | ICD-10-CM

## 2020-12-21 DIAGNOSIS — I35.1 NONRHEUMATIC AORTIC VALVE INSUFFICIENCY: Chronic | ICD-10-CM

## 2020-12-21 DIAGNOSIS — I71.20 THORACIC AORTIC ANEURYSM WITHOUT RUPTURE: Chronic | ICD-10-CM

## 2020-12-21 PROCEDURE — 99214 OFFICE O/P EST MOD 30 MIN: CPT | Mod: S$GLB,,, | Performed by: NUCLEAR MEDICINE

## 2020-12-21 PROCEDURE — 93010 EKG 12-LEAD: ICD-10-PCS | Mod: ,,, | Performed by: INTERNAL MEDICINE

## 2020-12-21 PROCEDURE — 3008F PR BODY MASS INDEX (BMI) DOCUMENTED: ICD-10-PCS | Mod: CPTII,S$GLB,, | Performed by: NUCLEAR MEDICINE

## 2020-12-21 PROCEDURE — 93005 ELECTROCARDIOGRAM TRACING: CPT

## 2020-12-21 PROCEDURE — 99214 PR OFFICE/OUTPT VISIT, EST, LEVL IV, 30-39 MIN: ICD-10-PCS | Mod: S$GLB,,, | Performed by: NUCLEAR MEDICINE

## 2020-12-21 PROCEDURE — 99999 PR PBB SHADOW E&M-EST. PATIENT-LVL III: CPT | Mod: PBBFAC,,, | Performed by: NUCLEAR MEDICINE

## 2020-12-21 PROCEDURE — 3008F BODY MASS INDEX DOCD: CPT | Mod: CPTII,S$GLB,, | Performed by: NUCLEAR MEDICINE

## 2020-12-21 PROCEDURE — 99999 PR PBB SHADOW E&M-EST. PATIENT-LVL III: ICD-10-PCS | Mod: PBBFAC,,, | Performed by: NUCLEAR MEDICINE

## 2020-12-21 PROCEDURE — 93010 ELECTROCARDIOGRAM REPORT: CPT | Mod: ,,, | Performed by: INTERNAL MEDICINE

## 2020-12-21 NOTE — PROGRESS NOTES
Subjective:   Patient ID:  Toni Prado is a 30 y.o. male who presents for follow-up of Valvular Heart Disease (BAV-) and THORACIC AA (4.3 CM)      HPI DOING WELL. NO RECENT HOSPITALIZATIONS OR ED VISITS FOR UNCONTROLLED BP, CVA, ARRHYTHMIAS, ADHF OR ACS  NO BACK PAIN. NO CHEST DISCOMFORT  NO FOCAL CNS SYMPTOMS OR SIGNS TO SUGGEST TIA OR STROKE  NO ABDOMINAL DISCOMFORT  NO INTERMITTENT CLAUDICATION  NO EDEMA. NO CALVE TENDERNESS  NO PALPITATIONS  NO NEAR SYNCOPE OR SYNCOPE  RECENT CT OF CHEST, NON CORONARY WAS REVIEWED AND DISCUSSED  ECG TODAY-SB, 58 BMP, OTHERWISE UNREMARKABLE    Review of Systems   Constitution: Negative for chills, fever, night sweats, weight gain and weight loss.   HENT: Negative for nosebleeds.    Eyes: Negative for blurred vision, double vision and visual disturbance.   Cardiovascular: Negative for chest pain, dyspnea on exertion, irregular heartbeat, leg swelling, orthopnea, palpitations, paroxysmal nocturnal dyspnea and syncope.   Respiratory: Negative for cough, hemoptysis and wheezing.    Endocrine: Negative for polydipsia and polyuria.   Hematologic/Lymphatic: Does not bruise/bleed easily.   Skin: Negative for rash.   Musculoskeletal: Negative for joint pain, joint swelling, muscle weakness and myalgias.   Gastrointestinal: Negative for abdominal pain, hematemesis, jaundice and melena.   Genitourinary: Negative for dysuria, hematuria and nocturia.   Neurological: Negative for dizziness, focal weakness, headaches, sensory change and weakness.   Psychiatric/Behavioral: Negative for depression. The patient does not have insomnia and is not nervous/anxious.      Family History   Problem Relation Age of Onset    Hypertension Father      Past Medical History:   Diagnosis Date    Heart murmur     Stenosis     Valvular regurgitation     BAV-- MOD AR.     Social History     Socioeconomic History    Marital status:      Spouse name: Not on file    Number of children: Not on file     Years of education: Not on file    Highest education level: Not on file   Occupational History    Not on file   Social Needs    Financial resource strain: Not on file    Food insecurity     Worry: Not on file     Inability: Not on file    Transportation needs     Medical: Not on file     Non-medical: Not on file   Tobacco Use    Smoking status: Never Smoker    Smokeless tobacco: Never Used   Substance and Sexual Activity    Alcohol use: Yes     Comment: occassionally    Drug use: No    Sexual activity: Yes     Partners: Female   Lifestyle    Physical activity     Days per week: Not on file     Minutes per session: Not on file    Stress: Not on file   Relationships    Social connections     Talks on phone: Not on file     Gets together: Not on file     Attends Confucianist service: Not on file     Active member of club or organization: Not on file     Attends meetings of clubs or organizations: Not on file     Relationship status: Not on file   Other Topics Concern    Not on file   Social History Narrative    Not on file     No current outpatient medications on file prior to visit.     No current facility-administered medications on file prior to visit.      Review of patient's allergies indicates:  No Known Allergies    Objective:     Physical Exam   Constitutional: He is oriented to person, place, and time. He appears well-developed. No distress.   HENT:   Head: Normocephalic.   Eyes: Pupils are equal, round, and reactive to light. Conjunctivae are normal.   Neck: Neck supple. No JVD present. No thyromegaly present.   Cardiovascular: Normal rate, regular rhythm and intact distal pulses. Exam reveals no gallop and no friction rub.   Murmur heard.   Low-pitched blowing decrescendo early diastolic murmur is present with a grade of 2/6 at the upper right sternal border, upper left sternal border and lower left sternal border. The intensity increases with respiration.  Pulses:       Carotid pulses are 2+  on the right side and 2+ on the left side.       Radial pulses are 2+ on the right side and 2+ on the left side.        Femoral pulses are 2+ on the right side and 2+ on the left side.       Popliteal pulses are 2+ on the right side and 2+ on the left side.        Dorsalis pedis pulses are 2+ on the right side and 2+ on the left side.        Posterior tibial pulses are 2+ on the right side and 2+ on the left side.   Pulmonary/Chest: Breath sounds normal. He has no wheezes. He has no rales. He exhibits no tenderness.   Abdominal: Soft. Bowel sounds are normal. He exhibits no mass. There is no hepatosplenomegaly. There is no abdominal tenderness.   Musculoskeletal:         General: No tenderness or edema.      Cervical back: Normal.      Thoracic back: Normal.      Lumbar back: Normal.   Lymphadenopathy:     He has no cervical adenopathy.     He has no axillary adenopathy.        Right: No supraclavicular adenopathy present.        Left: No supraclavicular adenopathy present.   Neurological: He is alert and oriented to person, place, and time. He has normal strength. No sensory deficit. Gait normal.   Skin: Skin is warm. No cyanosis. No pallor. Nails show no clubbing.   Psychiatric: He has a normal mood and affect. His speech is normal and behavior is normal. Cognition and memory are normal.       Assessment:     1. Bicuspid aortic valve    2. Nonrheumatic aortic valve insufficiency    3. Thoracic aortic aneurysm without rupture        Plan:     Bicuspid aortic valve  ASYMPTOMATIC    Nonrheumatic aortic valve insufficiency  AR MURMUR UNCHANGED    Thoracic aortic aneurysm without rupture  CT SCAN OF CHEST - TAA , STABLE. NO INCREASE ON SIZE    CONTINUE PRESENT CARD MANAGEMENT    RETURN IN ONE YEAR WITH CT OF CHEST , NON CORONARY    2/1/21-  ADDENDUM NOTE    CV STATUS STABLE  LOW RISK FOR LAMINE OPERATIVE CV EVENT FOR ELECTIVE VASECTOMY  NO NEED OF INFECTIVE ENDOCARDITIS PROPHYLAXIS  NO SPECIFIC CARD  RECOMMENDATIONS.

## 2021-01-18 ENCOUNTER — PATIENT MESSAGE (OUTPATIENT)
Dept: TRANSPLANT | Facility: CLINIC | Age: 31
End: 2021-01-18

## 2021-01-29 ENCOUNTER — OFFICE VISIT (OUTPATIENT)
Dept: PRIMARY CARE CLINIC | Facility: CLINIC | Age: 31
End: 2021-01-29
Payer: COMMERCIAL

## 2021-01-29 DIAGNOSIS — R05.9 COUGH: Primary | ICD-10-CM

## 2021-01-29 PROCEDURE — 99214 OFFICE O/P EST MOD 30 MIN: CPT | Mod: 95,,, | Performed by: PHYSICIAN ASSISTANT

## 2021-01-29 PROCEDURE — 99214 PR OFFICE/OUTPT VISIT, EST, LEVL IV, 30-39 MIN: ICD-10-PCS | Mod: 95,,, | Performed by: PHYSICIAN ASSISTANT

## 2021-01-29 RX ORDER — BENZONATATE 100 MG/1
100 CAPSULE ORAL 3 TIMES DAILY PRN
Qty: 30 CAPSULE | Refills: 0 | Status: SHIPPED | OUTPATIENT
Start: 2021-01-29 | End: 2021-02-08

## 2021-01-29 RX ORDER — LORATADINE 10 MG/1
10 TABLET ORAL DAILY
Qty: 30 TABLET | Refills: 0 | Status: SHIPPED | OUTPATIENT
Start: 2021-01-29 | End: 2021-12-21 | Stop reason: ALTCHOICE

## 2021-02-01 ENCOUNTER — PATIENT MESSAGE (OUTPATIENT)
Dept: TRANSPLANT | Facility: CLINIC | Age: 31
End: 2021-02-01

## 2021-12-07 DIAGNOSIS — I35.8 OTHER NONRHEUMATIC AORTIC VALVE DISORDERS: Primary | ICD-10-CM

## 2021-12-08 ENCOUNTER — PATIENT MESSAGE (OUTPATIENT)
Dept: TRANSPLANT | Facility: CLINIC | Age: 31
End: 2021-12-08
Payer: COMMERCIAL

## 2021-12-08 DIAGNOSIS — I35.1 NONRHEUMATIC AORTIC VALVE INSUFFICIENCY: ICD-10-CM

## 2021-12-08 DIAGNOSIS — I71.20 THORACIC AORTIC ANEURYSM WITHOUT RUPTURE: Primary | ICD-10-CM

## 2021-12-17 ENCOUNTER — TELEPHONE (OUTPATIENT)
Dept: RADIOLOGY | Facility: HOSPITAL | Age: 31
End: 2021-12-17
Payer: COMMERCIAL

## 2021-12-20 ENCOUNTER — HOSPITAL ENCOUNTER (OUTPATIENT)
Dept: RADIOLOGY | Facility: HOSPITAL | Age: 31
Discharge: HOME | End: 2021-12-20
Attending: NUCLEAR MEDICINE
Payer: COMMERCIAL

## 2021-12-20 DIAGNOSIS — I71.20 THORACIC AORTIC ANEURYSM WITHOUT RUPTURE: ICD-10-CM

## 2021-12-20 DIAGNOSIS — I35.1 NONRHEUMATIC AORTIC VALVE INSUFFICIENCY: ICD-10-CM

## 2021-12-20 PROCEDURE — 71275 CTA CHEST NON CORONARY: ICD-10-PCS | Mod: 26,,, | Performed by: RADIOLOGY

## 2021-12-20 PROCEDURE — 71275 CT ANGIOGRAPHY CHEST: CPT | Mod: TC

## 2021-12-20 PROCEDURE — 25500020 PHARM REV CODE 255: Performed by: NUCLEAR MEDICINE

## 2021-12-20 PROCEDURE — 71275 CT ANGIOGRAPHY CHEST: CPT | Mod: 26,,, | Performed by: RADIOLOGY

## 2021-12-20 RX ADMIN — IOHEXOL 100 ML: 350 INJECTION, SOLUTION INTRAVENOUS at 03:12

## 2021-12-21 ENCOUNTER — HOSPITAL ENCOUNTER (OUTPATIENT)
Dept: CARDIOLOGY | Facility: HOSPITAL | Age: 31
Discharge: HOME | End: 2021-12-21
Attending: NUCLEAR MEDICINE
Payer: COMMERCIAL

## 2021-12-21 ENCOUNTER — OFFICE VISIT (OUTPATIENT)
Dept: TRANSPLANT | Facility: CLINIC | Age: 31
End: 2021-12-21
Payer: COMMERCIAL

## 2021-12-21 VITALS
DIASTOLIC BLOOD PRESSURE: 60 MMHG | OXYGEN SATURATION: 97 % | HEART RATE: 86 BPM | SYSTOLIC BLOOD PRESSURE: 130 MMHG | HEIGHT: 67 IN | WEIGHT: 232.81 LBS | BODY MASS INDEX: 36.54 KG/M2

## 2021-12-21 DIAGNOSIS — I35.8 OTHER NONRHEUMATIC AORTIC VALVE DISORDERS: ICD-10-CM

## 2021-12-21 DIAGNOSIS — Q23.1 BICUSPID AORTIC VALVE: Primary | ICD-10-CM

## 2021-12-21 DIAGNOSIS — I35.1 NONRHEUMATIC AORTIC VALVE INSUFFICIENCY: Chronic | ICD-10-CM

## 2021-12-21 DIAGNOSIS — I71.20 THORACIC AORTIC ANEURYSM WITHOUT RUPTURE: Chronic | ICD-10-CM

## 2021-12-21 PROCEDURE — 99214 OFFICE O/P EST MOD 30 MIN: CPT | Mod: S$GLB,,, | Performed by: NUCLEAR MEDICINE

## 2021-12-21 PROCEDURE — 93005 ELECTROCARDIOGRAM TRACING: CPT

## 2021-12-21 PROCEDURE — 99214 PR OFFICE/OUTPT VISIT, EST, LEVL IV, 30-39 MIN: ICD-10-PCS | Mod: S$GLB,,, | Performed by: NUCLEAR MEDICINE

## 2021-12-21 PROCEDURE — 99999 PR PBB SHADOW E&M-EST. PATIENT-LVL III: CPT | Mod: PBBFAC,,, | Performed by: NUCLEAR MEDICINE

## 2021-12-21 PROCEDURE — 93010 ELECTROCARDIOGRAM REPORT: CPT | Mod: ,,, | Performed by: INTERNAL MEDICINE

## 2021-12-21 PROCEDURE — 99999 PR PBB SHADOW E&M-EST. PATIENT-LVL III: ICD-10-PCS | Mod: PBBFAC,,, | Performed by: NUCLEAR MEDICINE

## 2021-12-21 PROCEDURE — 93010 EKG 12-LEAD: ICD-10-PCS | Mod: ,,, | Performed by: INTERNAL MEDICINE

## 2022-01-06 ENCOUNTER — HOSPITAL ENCOUNTER (OUTPATIENT)
Dept: CARDIOLOGY | Facility: HOSPITAL | Age: 32
Discharge: HOME | End: 2022-01-06
Attending: NUCLEAR MEDICINE
Payer: COMMERCIAL

## 2022-01-06 VITALS
HEIGHT: 67 IN | BODY MASS INDEX: 36.41 KG/M2 | HEART RATE: 60 BPM | WEIGHT: 232 LBS | DIASTOLIC BLOOD PRESSURE: 64 MMHG | SYSTOLIC BLOOD PRESSURE: 132 MMHG

## 2022-01-06 DIAGNOSIS — Q23.1 BICUSPID AORTIC VALVE: ICD-10-CM

## 2022-01-06 DIAGNOSIS — I35.1 NONRHEUMATIC AORTIC VALVE INSUFFICIENCY: ICD-10-CM

## 2022-01-06 LAB
AORTIC ROOT ANNULUS: 3.47 CM
ASCENDING AORTA: 4.19 CM
AV INDEX (PROSTH): 0.68
AV MEAN GRADIENT: 8 MMHG
AV PEAK GRADIENT: 16 MMHG
AV REGURGITATION PRESSURE HALF TIME: 471.71 MS
AV VALVE AREA: 4.32 CM2
AV VELOCITY RATIO: 0.59
BSA FOR ECHO PROCEDURE: 2.23 M2
CV ECHO LV RWT: 0.32 CM
DOP CALC AO PEAK VEL: 2.01 M/S
DOP CALC AO VTI: 43.4 CM
DOP CALC LVOT AREA: 6.3 CM2
DOP CALC LVOT DIAMETER: 2.84 CM
DOP CALC LVOT PEAK VEL: 1.19 M/S
DOP CALC LVOT STROKE VOLUME: 187.41 CM3
DOP CALC RVOT PEAK VEL: 0.86 M/S
DOP CALC RVOT VTI: 20.6 CM
DOP CALCLVOT PEAK VEL VTI: 29.6 CM
E WAVE DECELERATION TIME: 184.55 MSEC
E/A RATIO: 1.94
E/E' RATIO: 7.5 M/S
ECHO EF ESTIMATED: 59 %
ECHO LV POSTERIOR WALL: 0.9 CM (ref 0.6–1.1)
EJECTION FRACTION: 55 %
FRACTIONAL SHORTENING: 32 % (ref 28–44)
INTERVENTRICULAR SEPTUM: 0.85 CM (ref 0.6–1.1)
IVRT: 78.02 MSEC
LA MAJOR: 4.13 CM
LA MINOR: 4.8 CM
LA WIDTH: 2.51 CM
LEFT ATRIUM SIZE: 3.73 CM
LEFT ATRIUM VOLUME INDEX: 16.4 ML/M2
LEFT ATRIUM VOLUME: 35.33 CM3
LEFT INTERNAL DIMENSION IN SYSTOLE: 3.8 CM (ref 2.1–4)
LEFT VENTRICLE DIASTOLIC VOLUME INDEX: 70.61 ML/M2
LEFT VENTRICLE DIASTOLIC VOLUME: 151.81 ML
LEFT VENTRICLE MASS INDEX: 85 G/M2
LEFT VENTRICLE SYSTOLIC VOLUME INDEX: 28.8 ML/M2
LEFT VENTRICLE SYSTOLIC VOLUME: 61.97 ML
LEFT VENTRICULAR INTERNAL DIMENSION IN DIASTOLE: 5.57 CM (ref 3.5–6)
LEFT VENTRICULAR MASS: 183.15 G
LV LATERAL E/E' RATIO: 7 M/S
LV SEPTAL E/E' RATIO: 8.08 M/S
LVOT MG: 3.8 MMHG
LVOT MV: 0.93 CM/S
MV PEAK A VEL: 0.54 M/S
MV PEAK E VEL: 1.05 M/S
MV STENOSIS PRESSURE HALF TIME: 53.52 MS
MV VALVE AREA P 1/2 METHOD: 4.11 CM2
PISA AR MAX VEL: 3.99 M/S
PISA TR MAX VEL: 2.41 M/S
PV MEAN GRADIENT: 1.46 MMHG
PV MV: 0.84 M/S
PV PEAK VELOCITY: 1.24 CM/S
RA MAJOR: 3.28 CM
RA PRESSURE: 3 MMHG
RA WIDTH: 2.07 CM
SINUS: 3.66 CM
STJ: 4.04 CM
TDI LATERAL: 0.15 M/S
TDI SEPTAL: 0.13 M/S
TDI: 0.14 M/S
TR MAX PG: 23 MMHG
TRICUSPID ANNULAR PLANE SYSTOLIC EXCURSION: 2.29 CM
TV REST PULMONARY ARTERY PRESSURE: 26 MMHG

## 2022-01-06 PROCEDURE — 93306 ECHO (CUPID ONLY): ICD-10-PCS | Mod: 26,,, | Performed by: INTERNAL MEDICINE

## 2022-01-06 PROCEDURE — 93306 TTE W/DOPPLER COMPLETE: CPT | Mod: PO

## 2022-01-06 PROCEDURE — 93306 TTE W/DOPPLER COMPLETE: CPT | Mod: 26,,, | Performed by: INTERNAL MEDICINE

## 2022-01-19 ENCOUNTER — PATIENT MESSAGE (OUTPATIENT)
Dept: ADMINISTRATIVE | Facility: OTHER | Age: 32
End: 2022-01-19
Payer: COMMERCIAL

## 2022-01-19 ENCOUNTER — PATIENT MESSAGE (OUTPATIENT)
Dept: INTERNAL MEDICINE | Facility: CLINIC | Age: 32
End: 2022-01-19

## 2022-01-19 ENCOUNTER — OFFICE VISIT (OUTPATIENT)
Dept: INTERNAL MEDICINE | Facility: CLINIC | Age: 32
End: 2022-01-19
Payer: COMMERCIAL

## 2022-01-19 DIAGNOSIS — Z20.822 EXPOSURE TO COVID-19 VIRUS: ICD-10-CM

## 2022-01-19 DIAGNOSIS — R51.9 NEW ONSET HEADACHE: Primary | ICD-10-CM

## 2022-01-19 PROCEDURE — 1160F RVW MEDS BY RX/DR IN RCRD: CPT | Mod: CPTII,95,, | Performed by: INTERNAL MEDICINE

## 2022-01-19 PROCEDURE — 1160F PR REVIEW ALL MEDS BY PRESCRIBER/CLIN PHARMACIST DOCUMENTED: ICD-10-PCS | Mod: CPTII,95,, | Performed by: INTERNAL MEDICINE

## 2022-01-19 PROCEDURE — 99214 PR OFFICE/OUTPT VISIT, EST, LEVL IV, 30-39 MIN: ICD-10-PCS | Mod: 95,,, | Performed by: INTERNAL MEDICINE

## 2022-01-19 PROCEDURE — 1159F PR MEDICATION LIST DOCUMENTED IN MEDICAL RECORD: ICD-10-PCS | Mod: CPTII,95,, | Performed by: INTERNAL MEDICINE

## 2022-01-19 PROCEDURE — 1159F MED LIST DOCD IN RCRD: CPT | Mod: CPTII,95,, | Performed by: INTERNAL MEDICINE

## 2022-01-19 PROCEDURE — 99214 OFFICE O/P EST MOD 30 MIN: CPT | Mod: 95,,, | Performed by: INTERNAL MEDICINE

## 2022-01-19 RX ORDER — BUTALBITAL, ACETAMINOPHEN AND CAFFEINE 50; 325; 40 MG/1; MG/1; MG/1
1 TABLET ORAL EVERY 4 HOURS PRN
Qty: 30 TABLET | Refills: 0 | Status: SHIPPED | OUTPATIENT
Start: 2022-01-19 | End: 2022-01-24

## 2022-01-19 NOTE — PATIENT INSTRUCTIONS
Patient Education       Butalbital, Acetaminophen, and Caffeine (jessie iraheta, john lopez MIN oh fen, & KAF een)   Brand Names: US Martha; Capacet [DSC]; Esgic; Fioricet; Phrenilin Forte [DSC]; Vtol LQ; Zebutal   Warning   · This drug has acetaminophen in it. Liver problems have happened with the use of acetaminophen. Sometimes, this has led to a liver transplant or death. Most of the time, liver problems happened in people taking more than 4,000 mg (milligrams) of acetaminophen in a day. People were also often taking more than 1 drug that had acetaminophen.    What is this drug used for?   · It is used to treat tension headaches.    What do I need to tell my doctor BEFORE I take this drug?   · If you are allergic to this drug; any part of this drug; or any other drugs, foods, or substances. Tell your doctor about the allergy and what signs you had.  · If you have porphyria.  This is not a list of all drugs or health problems that interact with this drug.  Tell your doctor and pharmacist about all of your drugs (prescription or OTC, natural products, vitamins) and health problems. You must check to make sure that it is safe for you to take this drug with all of your drugs and health problems. Do not start, stop, or change the dose of any drug without checking with your doctor.  What are some things I need to know or do while I take this drug?   · Tell all of your health care providers that you take this drug. This includes your doctors, nurses, pharmacists, and dentists.  · Avoid driving and doing other tasks or actions that call for you to be alert until you see how this drug affects you.  · This drug may be habit-forming with long-term use.  · If you have been taking this drug for a long time or at high doses, it may not work as well and you may need higher doses to get the same effect. This is known as tolerance. Call your doctor if this drug stops working well. Do not take more than ordered.  · Do not take this  drug for longer than you were told by your doctor.  · If you have been taking this drug on a regular basis and you stop it all of a sudden, you may have signs of withdrawal. Do not stop taking this drug all of a sudden without calling your doctor. Tell your doctor if you have any bad effects.  · This drug may affect certain lab tests. Tell all of your health care providers and lab workers that you take this drug.  · Avoid alcohol, marijuana or other forms of cannabis, or prescription or OTC drugs that may slow your actions.  · Avoid taking other products that have acetaminophen in them. Check labels closely. Too much acetaminophen may cause liver problems.  · Follow the directions exactly. Do not take more acetaminophen in a day than directed. If you do not know how much acetaminophen you can take in a day, ask your doctor or pharmacist. Some people may take up to 4,000 mg (milligrams) in a day if told to do so by the doctor. Some people (like people with liver problems and children) should take less acetaminophen. Call your doctor right away if you have taken too much acetaminophen in a day, even if you feel well.  · Be careful if you have G6PD deficiency. Anemia may happen.  · Limit your use of caffeine (for example, tea, coffee, cola) and chocolate. Use with this drug may cause nervousness, shakiness, and a fast heartbeat.  · If you are 65 or older, use this drug with care. You could have more side effects.  · Tell your doctor if you are pregnant, plan on getting pregnant, or are breast-feeding. You will need to talk about the benefits and risks to you and the baby.    What are some side effects that I need to call my doctor about right away?   WARNING/CAUTION: Even though it may be rare, some people may have very bad and sometimes deadly side effects when taking a drug. Tell your doctor or get medical help right away if you have any of the following signs or symptoms that may be related to a very bad side  effect:  · Signs of an allergic reaction, like rash; hives; itching; red, swollen, blistered, or peeling skin with or without fever; wheezing; tightness in the chest or throat; trouble breathing, swallowing, or talking; unusual hoarseness; or swelling of the mouth, face, lips, tongue, or throat. Rarely, some allergic reactions have been life-threatening.  · Signs of liver problems like dark urine, feeling tired, not hungry, upset stomach or stomach pain, light-colored stools, throwing up, or yellow skin or eyes.  · Shortness of breath.  · Not able to pass urine or change in how much urine is passed.  · Slurred speech, stumbling, or feeling confused, very sleepy or dizzy, or drunk.  · A severe skin reaction (Hodge-Chip syndrome/toxic epidermal necrolysis) may happen. It can cause severe health problems that may not go away, and sometimes death. Get medical help right away if you have signs like red, swollen, blistered, or peeling skin (with or without fever); red or irritated eyes; or sores in your mouth, throat, nose, or eyes.  What are some other side effects of this drug?   All drugs may cause side effects. However, many people have no side effects or only have minor side effects. Call your doctor or get medical help if any of these side effects or any other side effects bother you or do not go away:  · Feeling dizzy or sleepy.  · Stomach pain.  · Upset stomach or throwing up.  · Feeling nervous and excitable.  These are not all of the side effects that may occur. If you have questions about side effects, call your doctor. Call your doctor for medical advice about side effects.  You may report side effects to your national health agency.  You may report side effects to the FDA at 1-693.515.2322. You may also report side effects at https://www.fda.gov/medwatch.  How is this drug best taken?   Use this drug as ordered by your doctor. Read all information given to you. Follow all instructions closely.  All  products:   · Take with or without food. Take with food if it causes an upset stomach.  Liquid (solution):   · Measure liquid doses carefully. Use the measuring device that comes with this drug. If there is none, ask the pharmacist for a device to measure this drug.  What do I do if I miss a dose?   · If you take this drug on a regular basis, take a missed dose as soon as you think about it.  · If it is close to the time for your next dose, skip the missed dose and go back to your normal time.  · Do not take 2 doses at the same time or extra doses.  · Many times this drug is taken on an as needed basis. Do not take more often than told by the doctor.    How do I store and/or throw out this drug?   · Store at room temperature in a dry place. Do not store in a bathroom.  · Store this drug in a safe place where children cannot see or reach it, and where other people cannot get to it. A locked box or area may help keep this drug safe. Keep all drugs away from pets.  · Throw away unused or  drugs. Do not flush down a toilet or pour down a drain unless you are told to do so. Check with your pharmacist if you have questions about the best way to throw out drugs. There may be drug take-back programs in your area.    General drug facts   · If your symptoms or health problems do not get better or if they become worse, call your doctor.  · Do not share your drugs with others and do not take anyone else's drugs.  · Some drugs may have another patient information leaflet. If you have any questions about this drug, please talk with your doctor, nurse, pharmacist, or other health care provider.  · Some drugs may have another patient information leaflet. Check with your pharmacist. If you have any questions about this drug, please talk with your doctor, nurse, pharmacist, or other health care provider.  · If you think there has been an overdose, call your poison control center or get medical care right away. Be ready to tell  or show what was taken, how much, and when it happened.    Consumer Information Use and Disclaimer   This generalized information is a limited summary of diagnosis, treatment, and/or medication information. It is not meant to be comprehensive and should be used as a tool to help the user understand and/or assess potential diagnostic and treatment options. It does NOT include all information about conditions, treatments, medications, side effects, or risks that may apply to a specific patient. It is not intended to be medical advice or a substitute for the medical advice, diagnosis, or treatment of a health care provider based on the health care provider's examination and assessment of a patient's specific and unique circumstances. Patients must speak with a health care provider for complete information about their health, medical questions, and treatment options, including any risks or benefits regarding use of medications. This information does not endorse any treatments or medications as safe, effective, or approved for treating a specific patient. [employer] and its affiliates disclaim any warranty or liability relating to this information or the use thereof. The use of this information is governed by the Terms of Use, available at https://www.[employer].com/en/solutions/lexicomp/about/aide.  Last Reviewed Date   2020-10-13  Copyright   © 2021 [employer] and its affiliates and/or licensors. All rights reserved.

## 2022-01-19 NOTE — PROGRESS NOTES
Subjective:       Patient ID: Toni Prado is a 31 y.o. male.    Chief Complaint: Headache    New patient to me.     The patient location is: [place]   The chief complaint leading to consultation is: headache    Visit type: audiovisual    Face to Face time with patient: 7 minutes   7 minutes of total time spent on the encounter, which includes face to face time and non-face to face time preparing to see the patient (eg, review of tests), Obtaining and/or reviewing separately obtained history, Documenting clinical information in the electronic or other health record, Independently interpreting results (not separately reported) and communicating results to the patient/family/caregiver, or Care coordination (not separately reported).         Each patient to whom he or she provides medical services by telemedicine is:  (1) informed of the relationship between the physician and patient and the respective role of any other health care provider with respect to management of the patient; and (2) notified that he or she may decline to receive medical services by telemedicine and may withdraw from such care at any time.    Notes:     Headache   This is a new problem. The current episode started in the past 7 days. The problem occurs intermittently. The problem has been waxing and waning. The pain is located in the frontal region. The pain does not radiate. The pain quality is similar to prior headaches. The quality of the pain is described as aching. The pain is severe. Associated symptoms include rhinorrhea. Pertinent negatives include no blurred vision, coughing, dizziness, fever, hearing loss, nausea, neck pain, numbness, photophobia, sore throat, visual change, vomiting or weakness. Nothing aggravates the symptoms. He has tried acetaminophen, darkened room and NSAIDs for the symptoms. The treatment provided mild relief. His past medical history is significant for obesity. There is no history of migraine headaches or  recent head traumas.     Wife tested positive for COVID 19 a couple of days ago.   He does not wish to be tested, but assumes he has it. He has quarantined.     Review of Systems   Constitutional: Negative for activity change, fever and unexpected weight change.   HENT: Positive for rhinorrhea. Negative for hearing loss, sore throat and trouble swallowing.    Eyes: Negative for blurred vision, photophobia, discharge and visual disturbance.   Respiratory: Negative for cough, chest tightness and wheezing.    Cardiovascular: Negative for chest pain and palpitations.   Gastrointestinal: Negative for blood in stool, constipation, diarrhea, nausea and vomiting.   Endocrine: Negative for polydipsia and polyuria.   Genitourinary: Negative for difficulty urinating, hematuria and urgency.   Musculoskeletal: Negative for arthralgias, joint swelling and neck pain.   Neurological: Positive for headaches. Negative for dizziness, weakness and numbness.   Psychiatric/Behavioral: Negative for confusion and dysphoric mood.         Objective:      Physical Exam  Constitutional:       General: He is not in acute distress.     Appearance: He is well-developed and well-nourished.   Pulmonary:      Effort: Pulmonary effort is normal. No respiratory distress.   Neurological:      Mental Status: He is alert and oriented to person, place, and time.   Psychiatric:         Mood and Affect: Mood and affect normal.         Behavior: Behavior normal.         Thought Content: Thought content normal.         Judgment: Judgment normal.         Assessment:       Problem List Items Addressed This Visit    None     Visit Diagnoses     New onset headache    -  Primary    Relevant Medications    butalbital-acetaminophen-caffeine -40 mg (FIORICET, ESGIC) -40 mg per tablet    Exposure to COVID-19 virus              Plan:       Toni was seen today for headache.    Diagnoses and all orders for this visit:    New onset headache  -      butalbital-acetaminophen-caffeine -40 mg (FIORICET, ESGIC) -40 mg per tablet; Take 1 tablet by mouth every 4 (four) hours as needed for Headaches.    Exposure to COVID-19 virus    Advised to quarantine. Recommend testing but he declines.     RTC if symptoms worsen or fail to resolve with treatment.

## 2022-01-20 ENCOUNTER — TELEPHONE (OUTPATIENT)
Dept: INTERNAL MEDICINE | Facility: CLINIC | Age: 32
End: 2022-01-20
Payer: COMMERCIAL

## 2022-01-20 ENCOUNTER — HOSPITAL ENCOUNTER (EMERGENCY)
Facility: HOSPITAL | Age: 32
Discharge: HOME | End: 2022-01-20
Attending: EMERGENCY MEDICINE
Payer: COMMERCIAL

## 2022-01-20 VITALS
BODY MASS INDEX: 36.21 KG/M2 | HEART RATE: 88 BPM | RESPIRATION RATE: 16 BRPM | DIASTOLIC BLOOD PRESSURE: 69 MMHG | SYSTOLIC BLOOD PRESSURE: 138 MMHG | WEIGHT: 230.69 LBS | TEMPERATURE: 98 F | OXYGEN SATURATION: 99 % | HEIGHT: 67 IN

## 2022-01-20 DIAGNOSIS — B34.9 VIRAL SYNDROME: Primary | ICD-10-CM

## 2022-01-20 DIAGNOSIS — R51.9 ACUTE NONINTRACTABLE HEADACHE, UNSPECIFIED HEADACHE TYPE: ICD-10-CM

## 2022-01-20 LAB
CTP QC/QA: YES
SARS-COV-2 RDRP RESP QL NAA+PROBE: NEGATIVE

## 2022-01-20 PROCEDURE — U0002 COVID-19 LAB TEST NON-CDC: HCPCS | Performed by: EMERGENCY MEDICINE

## 2022-01-20 PROCEDURE — 99284 EMERGENCY DEPT VISIT MOD MDM: CPT | Mod: 25

## 2022-01-20 PROCEDURE — 25000003 PHARM REV CODE 250: Performed by: EMERGENCY MEDICINE

## 2022-01-20 PROCEDURE — 96372 THER/PROPH/DIAG INJ SC/IM: CPT

## 2022-01-20 PROCEDURE — 63600175 PHARM REV CODE 636 W HCPCS: Performed by: EMERGENCY MEDICINE

## 2022-01-20 RX ORDER — METOCLOPRAMIDE 5 MG/1
10 TABLET ORAL
Status: COMPLETED | OUTPATIENT
Start: 2022-01-20 | End: 2022-01-20

## 2022-01-20 RX ORDER — BUTORPHANOL TARTRATE 1 MG/ML
1 INJECTION INTRAMUSCULAR; INTRAVENOUS
Status: COMPLETED | OUTPATIENT
Start: 2022-01-20 | End: 2022-01-20

## 2022-01-20 RX ORDER — BUTORPHANOL TARTRATE 10 MG/ML
1 SPRAY NASAL EVERY 4 HOURS PRN
Qty: 2.5 ML | Refills: 0 | Status: SHIPPED | OUTPATIENT
Start: 2022-01-20 | End: 2022-01-30

## 2022-01-20 RX ORDER — HYDROMORPHONE HYDROCHLORIDE 1 MG/ML
1 INJECTION, SOLUTION INTRAMUSCULAR; INTRAVENOUS; SUBCUTANEOUS
Status: COMPLETED | OUTPATIENT
Start: 2022-01-20 | End: 2022-01-20

## 2022-01-20 RX ADMIN — BUTORPHANOL TARTRATE 1 MG: 1 INJECTION, SOLUTION INTRAMUSCULAR; INTRAVENOUS at 03:01

## 2022-01-20 RX ADMIN — HYDROMORPHONE HYDROCHLORIDE 1 MG: 1 INJECTION, SOLUTION INTRAMUSCULAR; INTRAVENOUS; SUBCUTANEOUS at 05:01

## 2022-01-20 RX ADMIN — METOCLOPRAMIDE 10 MG: 5 TABLET ORAL at 05:01

## 2022-01-20 NOTE — ED PROVIDER NOTES
SCRIBE #1 NOTE: I, Bebe Saunders, am scribing for, and in the presence of, Tavo Li Jr., MD. I have scribed the entire note.      History      Chief Complaint   Patient presents with    COVID-19 Concerns     Headache since Sunday, congestion, wife is covid positive, started Fiorcet yesterday, helped last night but headache returned today, advised my Dr office to come to ER if worsening of symptoms       Review of patient's allergies indicates:  No Known Allergies     HPI   HPI    1/20/2022, 3:14 PM   History obtained from the patient      History of Present Illness: Toni Prado is a 31 y.o. male patient with a PMHx of heart murmur stenosis and valvular regurgitation who presents to the Emergency Department for a worsesning frontal headache which onset gradually on 1/16/2022. The pt was seen by Dr. Frost (Internal Medicine), yesterday, was prescribed Fioricet, and advised to come to the ER if his sxs worsened. He reports that Fioricet helped with his sxs last night, but this morning his headache returned. The reports that his wife has recently tested positive for COVID-19.  Symptoms are constant and moderate in severity. No mitigating or exacerbating factors reported. Associated sxs include congestion, generalized myalgias, subjective fever, and chills. Patient denies any SOB, CP, n/v/d, abdominal pain, weakness, numbness, and all other sxs at this time. The pt reports being vaccinated for COVID-19. No further complaints or concerns at this time.         Arrival mode: Personal vehicle    PCP: Claude Castillo MD       Past Medical History:  Past Medical History:   Diagnosis Date    Heart murmur     History of vasectomy 03/20/2021    Stenosis     Valvular regurgitation     BAV-- MOD AR.       Past Surgical History:  No past surgical history on file.      Family History:  Family History   Problem Relation Age of Onset    Hypertension Father        Social History:  Social History     Tobacco Use     Smoking status: Never Smoker    Smokeless tobacco: Never Used   Substance and Sexual Activity    Alcohol use: Yes     Comment: occassionally    Drug use: No    Sexual activity: Yes     Partners: Female       ROS   Review of Systems   Constitutional: Positive for chills and fever (subjective).   HENT: Positive for congestion. Negative for sore throat.    Respiratory: Negative for shortness of breath.    Cardiovascular: Negative for chest pain.   Gastrointestinal: Negative for abdominal pain, diarrhea, nausea and vomiting.   Genitourinary: Negative for dysuria.   Musculoskeletal: Positive for myalgias (generalized). Negative for back pain.   Skin: Negative for rash.   Neurological: Positive for headaches (worsening frontal). Negative for weakness and numbness.   Hematological: Does not bruise/bleed easily.   All other systems reviewed and are negative.    Physical Exam      Initial Vitals [01/20/22 1402]   BP Pulse Resp Temp SpO2   (!) 161/79 103 18 99 °F (37.2 °C) 99 %      MAP       --          Physical Exam  Nursing Notes and Vital Signs Reviewed.  Constitutional: Patient is in no acute distress. Well-developed and well-nourished.  Head: Atraumatic. Normocephalic.  Eyes:  EOM intact.  No scleral icterus.  ENT: Mucous membranes are moist.  Nares clear   Neck:  Full ROM. No JVD.  Cardiovascular: Regular rate. Regular rhythm No murmurs, rubs, or gallops. Distal pulses are 2+ and symmetric  Pulmonary/Chest: No respiratory distress. Clear to auscultation bilaterally. No wheezing or rales.  Equal chest wall rise bilaterally  Abdominal: Soft and non-distended.  There is no tenderness.  No rebound, guarding, or rigidity. Good bowel sounds.  Genitourinary: No CVA tenderness.  No suprapubic tenderness  Musculoskeletal: Moves all extremities. No obvious deformities.  5 x 5 strength in all extremities   Skin: Warm and dry.  Neurological:  Alert, awake, and appropriate.  Normal speech.  No acute focal neurological deficits  "are appreciated.  Two through 12 intact bilaterally.  Psychiatric: Normal affect. Good eye contact. Appropriate in content.      ED Course    Procedures  ED Vital Signs:  Vitals:    01/20/22 1402 01/20/22 1611 01/20/22 1719   BP: (!) 161/79 138/69    Pulse: 103 64    Resp: 18  20   Temp: 99 °F (37.2 °C)     TempSrc: Oral     SpO2: 99% 96%    Weight: 104.6 kg (230 lb 11.4 oz)     Height: 5' 7" (1.702 m)         Abnormal Lab Results:  Labs Reviewed   SARS-COV-2 RDRP GENE    Narrative:     This test utilizes isothermal nucleic acid amplification   technology to detect the SARS-CoV-2 RdRp nucleic acid segment.   The analytical sensitivity (limit of detection) is 125 genome   equivalents/mL.   A POSITIVE result implies infection with the SARS-CoV-2 virus;   the patient is presumed to be contagious.     A NEGATIVE result means that SARS-CoV-2 nucleic acids are not   present above the limit of detection. A NEGATIVE result should be   treated as presumptive. It does not rule out the possibility of   COVID-19 and should not be the sole basis for treatment decisions.   If COVID-19 is strongly suspected based on clinical and exposure   history, re-testing using an alternate molecular assay should be   considered.   This test is only for use under the Food and Drug   Administration s Emergency Use Authorization (EUA).   Commercial kits are provided by [employer].   Performance characteristics of the EUA have been independently   verified by [facility] Department of   Pathology and Laboratory Medicine.   _________________________________________________________________   The authorized Fact Sheet for Healthcare Providers and the authorized Fact   Sheet for Patients of the ID NOW COVID-19 are available on the FDA   website:     https://www.fda.gov/media/881051/download  https://www.fda.gov/media/136760/download            All Lab Results:  Results for orders placed or performed during the hospital encounter of " 01/20/22   POCT COVID-19 Rapid Screening   Result Value Ref Range    POC Rapid COVID Negative Negative     Acceptable Yes          Imaging Results:  Imaging Results    None                 The Emergency Provider reviewed the vital signs and test results, which are outlined above.    ED Discussion   5:03 PM: Re-evaluated pt. Pt reports that his headache is still present.  D/w pt all pertinent results. D/w pt any concerns expressed at this time. Answered all questions. Pt expresses understanding at this time.    6:01 PM: Reassessed pt at this time. Discussed with pt all pertinent ED information and results. Discussed pt dx and plan of tx. Gave pt all f/u and return to the ED instructions. All questions and concerns were addressed at this time. Pt expresses understanding of information and instructions, and is comfortable with plan to discharge. Pt is stable for discharge.    I discussed with patient and/or family/caretaker that evaluation in the ED does not suggest any emergent or life threatening medical conditions requiring immediate intervention beyond what was provided in the ED, and I believe patient is safe for discharge.  Regardless, an unremarkable evaluation in the ED does not preclude the development or presence of a serious of life threatening condition. As such, patient was instructed to return immediately for any worsening or change in current symptoms.    Headache is now resolved.  Patient is stable nontoxic.  Patient has a negative COVID test however clinically likely has COVID.  Discussed this with the patient as well.  Treat and clinically with close follow-up.  Patient verbalized understanding room with all instructions reliable.  Safe for discharge.  Due to his lack of improvement with your set as an outpatient, I will prescribe state L nasal spray.  Patient verbalized understanding room with all instructions.         ED Medication(s):  Medications   butorphanol injection 1 mg (1 mg  Intramuscular Given 1/20/22 1546)   HYDROmorphone injection 1 mg (1 mg Intramuscular Given 1/20/22 1719)   metoclopramide HCl tablet 10 mg (10 mg Oral Given 1/20/22 1720)     New Prescriptions    BUTORPHANOL (STADOL) 10 MG/ML NASAL SPRAY    1 spray by Nasal route every 4 (four) hours as needed for Pain.        Follow-up Information     Claude Castillo MD.    Specialty: Family Medicine  Contact information:  [address]  [address]  [facility] [address] [phone]                           Medication List      START taking these medications    butorphanol 10 mg/mL nasal spray  Commonly known as: STADOL  1 spray by Nasal route every 4 (four) hours as needed for Pain.        ASK your doctor about these medications    butalbital-acetaminophen-caffeine -40 mg -40 mg per tablet  Commonly known as: FIORICET, ESGIC  Take 1 tablet by mouth every 4 (four) hours as needed for Headaches.           Where to Get Your Medications      You can get these medications from any pharmacy    Bring a paper prescription for each of these medications  · butorphanol 10 mg/mL nasal spray           Medical Decision Making    Medical Decision Making:   Clinical Tests:   Lab Tests: Ordered and Reviewed           Scribe Attestation:   Scribe #1: I performed the above scribed service and the documentation accurately describes the services I performed. I attest to the accuracy of the note.    Attending:   Physician Attestation Statement for Scribe #1: I, Tavo Li Jr., MD, personally performed the services described in this documentation, as scribed by Bebe Saunders, in my presence, and it is both accurate and complete.          Clinical Impression       ICD-10-CM ICD-9-CM   1. Viral syndrome  B34.9 079.99   2. Acute nonintractable headache, unspecified headache type  R51.9 784.0       Disposition:   Disposition: Discharged  Condition: Stable         Tavo Li Jr., MD  01/20/22 4849

## 2022-01-20 NOTE — TELEPHONE ENCOUNTER
----- Message from Brianna Castro sent at 1/20/2022 12:19 PM CST -----  States he was seen yesterday for his migraine. States the Fioricet helped at first. States he took the medicine an hour ago and it hasn't even touched the migraine. Please call pt [phone]. Thank you

## 2022-01-20 NOTE — TELEPHONE ENCOUNTER
Pt states he will go to the ER for his headaches and states its not getting any better advised pt to see what they say and follow up with his PCP

## 2022-02-16 ENCOUNTER — LAB VISIT (OUTPATIENT)
Dept: LAB | Facility: HOSPITAL | Age: 32
End: 2022-02-16
Attending: FAMILY MEDICINE
Payer: COMMERCIAL

## 2022-02-16 DIAGNOSIS — R10.13 EPIGASTRIC PAIN: ICD-10-CM

## 2022-02-16 LAB
ALBUMIN SERPL BCP-MCNC: 4.3 G/DL (ref 3.5–5.2)
ALP SERPL-CCNC: 61 U/L (ref 55–135)
ALT SERPL W/O P-5'-P-CCNC: 59 U/L (ref 10–44)
AMYLASE SERPL-CCNC: 42 U/L (ref 20–110)
ANION GAP SERPL CALC-SCNC: 6 MMOL/L (ref 8–16)
AST SERPL-CCNC: 35 U/L (ref 10–40)
BASOPHILS # BLD AUTO: 0.07 K/UL (ref 0–0.2)
BASOPHILS NFR BLD: 0.9 % (ref 0–1.9)
BILIRUB SERPL-MCNC: 0.7 MG/DL (ref 0.1–1)
BUN SERPL-MCNC: 12 MG/DL (ref 6–20)
CALCIUM SERPL-MCNC: 9.6 MG/DL (ref 8.7–10.5)
CHLORIDE SERPL-SCNC: 104 MMOL/L (ref 95–110)
CO2 SERPL-SCNC: 29 MMOL/L (ref 23–29)
CREAT SERPL-MCNC: 0.8 MG/DL (ref 0.5–1.4)
DIFFERENTIAL METHOD: NORMAL
EOSINOPHIL # BLD AUTO: 0.3 K/UL (ref 0–0.5)
EOSINOPHIL NFR BLD: 3.6 % (ref 0–8)
ERYTHROCYTE [DISTWIDTH] IN BLOOD BY AUTOMATED COUNT: 13.1 % (ref 11.5–14.5)
ERYTHROCYTE [SEDIMENTATION RATE] IN BLOOD BY WESTERGREN METHOD: <2 MM/HR (ref 0–23)
EST. GFR  (AFRICAN AMERICAN): >60 ML/MIN/1.73 M^2
EST. GFR  (NON AFRICAN AMERICAN): >60 ML/MIN/1.73 M^2
GLUCOSE SERPL-MCNC: 85 MG/DL (ref 70–110)
HCT VFR BLD AUTO: 43 % (ref 40–54)
HGB BLD-MCNC: 14.5 G/DL (ref 14–18)
IMM GRANULOCYTES # BLD AUTO: 0.02 K/UL (ref 0–0.04)
IMM GRANULOCYTES NFR BLD AUTO: 0.3 % (ref 0–0.5)
LIPASE SERPL-CCNC: 31 U/L (ref 4–60)
LYMPHOCYTES # BLD AUTO: 3.5 K/UL (ref 1–4.8)
LYMPHOCYTES NFR BLD: 44.9 % (ref 18–48)
MCH RBC QN AUTO: 28.2 PG (ref 27–31)
MCHC RBC AUTO-ENTMCNC: 33.7 G/DL (ref 32–36)
MCV RBC AUTO: 84 FL (ref 82–98)
MONOCYTES # BLD AUTO: 0.5 K/UL (ref 0.3–1)
MONOCYTES NFR BLD: 6.7 % (ref 4–15)
NEUTROPHILS # BLD AUTO: 3.4 K/UL (ref 1.8–7.7)
NEUTROPHILS NFR BLD: 43.6 % (ref 38–73)
NRBC BLD-RTO: 0 /100 WBC
PLATELET # BLD AUTO: 248 K/UL (ref 150–450)
PMV BLD AUTO: 11 FL (ref 9.2–12.9)
POTASSIUM SERPL-SCNC: 4.2 MMOL/L (ref 3.5–5.1)
PROT SERPL-MCNC: 7.3 G/DL (ref 6–8.4)
RBC # BLD AUTO: 5.15 M/UL (ref 4.6–6.2)
SODIUM SERPL-SCNC: 139 MMOL/L (ref 136–145)
WBC # BLD AUTO: 7.73 K/UL (ref 3.9–12.7)

## 2022-02-16 PROCEDURE — 80053 COMPREHEN METABOLIC PANEL: CPT | Performed by: FAMILY MEDICINE

## 2022-02-16 PROCEDURE — 36415 COLL VENOUS BLD VENIPUNCTURE: CPT | Mod: PO | Performed by: FAMILY MEDICINE

## 2022-02-16 PROCEDURE — 83690 ASSAY OF LIPASE: CPT | Performed by: FAMILY MEDICINE

## 2022-02-16 PROCEDURE — 85025 COMPLETE CBC W/AUTO DIFF WBC: CPT | Performed by: FAMILY MEDICINE

## 2022-02-16 PROCEDURE — 82150 ASSAY OF AMYLASE: CPT | Performed by: FAMILY MEDICINE

## 2022-02-16 PROCEDURE — 85652 RBC SED RATE AUTOMATED: CPT | Performed by: FAMILY MEDICINE

## 2022-02-17 ENCOUNTER — HOSPITAL ENCOUNTER (OUTPATIENT)
Dept: RADIOLOGY | Facility: HOSPITAL | Age: 32
Discharge: HOME | End: 2022-02-17
Attending: FAMILY MEDICINE
Payer: COMMERCIAL

## 2022-02-17 DIAGNOSIS — R10.13 EPIGASTRIC PAIN: ICD-10-CM

## 2022-02-17 PROCEDURE — 76700 US EXAM ABDOM COMPLETE: CPT | Mod: 26,,, | Performed by: RADIOLOGY

## 2022-02-17 PROCEDURE — 76700 US ABDOMEN COMPLETE: ICD-10-PCS | Mod: 26,,, | Performed by: RADIOLOGY

## 2022-02-17 PROCEDURE — 76700 US EXAM ABDOM COMPLETE: CPT | Mod: TC

## 2022-03-01 ENCOUNTER — OFFICE VISIT (OUTPATIENT)
Dept: CARDIOLOGY | Facility: CLINIC | Age: 32
End: 2022-03-01
Payer: COMMERCIAL

## 2022-03-01 VITALS
HEART RATE: 81 BPM | BODY MASS INDEX: 38.34 KG/M2 | DIASTOLIC BLOOD PRESSURE: 75 MMHG | OXYGEN SATURATION: 99 % | RESPIRATION RATE: 16 BRPM | HEIGHT: 67 IN | WEIGHT: 244.25 LBS | SYSTOLIC BLOOD PRESSURE: 135 MMHG

## 2022-03-01 DIAGNOSIS — I71.20 THORACIC AORTIC ANEURYSM WITHOUT RUPTURE: Chronic | ICD-10-CM

## 2022-03-01 DIAGNOSIS — I35.1 NONRHEUMATIC AORTIC VALVE INSUFFICIENCY: Chronic | ICD-10-CM

## 2022-03-01 DIAGNOSIS — Q23.1 BICUSPID AORTIC VALVE: Primary | ICD-10-CM

## 2022-03-01 PROCEDURE — 1160F PR REVIEW ALL MEDS BY PRESCRIBER/CLIN PHARMACIST DOCUMENTED: ICD-10-PCS | Mod: CPTII,S$GLB,, | Performed by: INTERNAL MEDICINE

## 2022-03-01 PROCEDURE — 99214 PR OFFICE/OUTPT VISIT, EST, LEVL IV, 30-39 MIN: ICD-10-PCS | Mod: S$GLB,,, | Performed by: INTERNAL MEDICINE

## 2022-03-01 PROCEDURE — 1159F PR MEDICATION LIST DOCUMENTED IN MEDICAL RECORD: ICD-10-PCS | Mod: CPTII,S$GLB,, | Performed by: INTERNAL MEDICINE

## 2022-03-01 PROCEDURE — 3008F PR BODY MASS INDEX (BMI) DOCUMENTED: ICD-10-PCS | Mod: CPTII,S$GLB,, | Performed by: INTERNAL MEDICINE

## 2022-03-01 PROCEDURE — 3075F SYST BP GE 130 - 139MM HG: CPT | Mod: CPTII,S$GLB,, | Performed by: INTERNAL MEDICINE

## 2022-03-01 PROCEDURE — 3078F DIAST BP <80 MM HG: CPT | Mod: CPTII,S$GLB,, | Performed by: INTERNAL MEDICINE

## 2022-03-01 PROCEDURE — 3008F BODY MASS INDEX DOCD: CPT | Mod: CPTII,S$GLB,, | Performed by: INTERNAL MEDICINE

## 2022-03-01 PROCEDURE — 3078F PR MOST RECENT DIASTOLIC BLOOD PRESSURE < 80 MM HG: ICD-10-PCS | Mod: CPTII,S$GLB,, | Performed by: INTERNAL MEDICINE

## 2022-03-01 PROCEDURE — 99214 OFFICE O/P EST MOD 30 MIN: CPT | Mod: S$GLB,,, | Performed by: INTERNAL MEDICINE

## 2022-03-01 PROCEDURE — 1160F RVW MEDS BY RX/DR IN RCRD: CPT | Mod: CPTII,S$GLB,, | Performed by: INTERNAL MEDICINE

## 2022-03-01 PROCEDURE — 1159F MED LIST DOCD IN RCRD: CPT | Mod: CPTII,S$GLB,, | Performed by: INTERNAL MEDICINE

## 2022-03-01 PROCEDURE — 99999 PR PBB SHADOW E&M-EST. PATIENT-LVL III: CPT | Mod: PBBFAC,,, | Performed by: INTERNAL MEDICINE

## 2022-03-01 PROCEDURE — 99999 PR PBB SHADOW E&M-EST. PATIENT-LVL III: ICD-10-PCS | Mod: PBBFAC,,, | Performed by: INTERNAL MEDICINE

## 2022-03-01 PROCEDURE — 3075F PR MOST RECENT SYSTOLIC BLOOD PRESS GE 130-139MM HG: ICD-10-PCS | Mod: CPTII,S$GLB,, | Performed by: INTERNAL MEDICINE

## 2022-03-01 NOTE — PROGRESS NOTES
Subjective:   Patient ID:  Toni Prado is a 32 y.o. male who presents for cardiac consult of [facility] (Former patient of Dr. Graves, )      HPI  The patient came in today for cardiac consult of [facility] (Former patient of Dr. Graves, )        Toni Prado is a 32 y.o. male pt with bicuspid AV with mod AI, mild aortic root dilation, obesity presents for CV follow up.       3/1/22  Pt seen by Dr. Boyd here to [facility] care. Pt had CT chest in Dec 2021 with unchanged TAA 4.2 cm. ECHO in Jan with mod AI.     He had mild viral GI issues, had fatty liver diagnosed. He will have HIDA scan as well. He has been having pain a short while after eating.     Patient feels  no chest pain, no sob, no leg swelling, no PND, no palpitation, no dizziness, no syncope, no CNS symptoms.    Patient has fairly good exercise tolerance. Manager at [employer].     Patient is compliant with medications.    FH - father - HTN, HLD    Impression:  Unchanged mild fusiform aneurysmal dilatation of the ascending thoracic aorta measuring up to 4.2 cm in diameter.     Electronically signed by: Ravindra Araiza MD  Date:                                            12/20/2021    Results for orders placed during the hospital encounter of 01/06/22    Echo    Interpretation Summary  · The aortic valve is bileaflet.  · Moderate aortic regurgitation.  · The ascending aorta is mildly dilated.  · Normal systolic function.  · The estimated ejection fraction is 55%.  · Normal left ventricular diastolic function.  · With normal right ventricular systolic function.  · Normal central venous pressure (3 mmHg).  · The estimated PA systolic pressure is 26 mmHg.      Past Medical History:   Diagnosis Date    Heart murmur     History of vasectomy 03/20/2021    Stenosis     Valvular regurgitation     BAV-- MOD AR.       History reviewed. No pertinent surgical history.    Social History     Tobacco Use    Smoking status:  "Never Smoker    Smokeless tobacco: Never Used   Substance Use Topics    Alcohol use: Yes     Comment: occassionally    Drug use: No       Family History   Problem Relation Age of Onset    Hypertension Father        Patient's Medications   New Prescriptions    No medications on file   Previous Medications    SUMATRIPTAN (IMITREX) 50 MG TABLET    Take 1 tablet (50 mg total) by mouth daily as needed for Migraine. May repeat in 2 hours prn, max 2 per 24 hours.   Modified Medications    No medications on file   Discontinued Medications    No medications on file       Review of Systems   Constitutional: Negative.    HENT: Negative.    Respiratory: Negative.    Cardiovascular: Negative.    Gastrointestinal: Positive for abdominal pain and heartburn. Negative for nausea.   Genitourinary: Negative.    Musculoskeletal: Negative.    Skin: Negative.    Neurological: Negative.    Endo/Heme/Allergies: Negative.    Psychiatric/Behavioral: Negative.        Wt Readings from Last 3 Encounters:   03/01/22 110.8 kg (244 lb 4.3 oz)   02/16/22 108.8 kg (239 lb 12.8 oz)   01/21/22 104.3 kg (230 lb)     Temp Readings from Last 3 Encounters:   01/20/22 98 °F (36.7 °C)   04/01/19 99 °F (37.2 °C) (Tympanic)   12/24/16 98.1 °F (36.7 °C) (Oral)     BP Readings from Last 3 Encounters:   03/01/22 135/75   02/16/22 123/71   01/21/22 139/77     Pulse Readings from Last 3 Encounters:   03/01/22 81   02/16/22 76   01/21/22 100       /75 (BP Location: Right arm, Patient Position: Sitting, BP Method: Large (Manual))   Pulse 81   Resp 16   Ht 5' 7" (1.702 m)   Wt 110.8 kg (244 lb 4.3 oz)   SpO2 99%   BMI 38.26 kg/m²     Objective:   Physical Exam  Vitals and nursing note reviewed.   Constitutional:       General: He is not in acute distress.     Appearance: He is well-developed. He is not diaphoretic.   HENT:      Head: Normocephalic and atraumatic.      Nose: Nose normal.   Eyes:      General: No scleral icterus.     Conjunctiva/sclera: " Conjunctivae normal.   Neck:      Thyroid: No thyromegaly.      Vascular: No JVD.   Cardiovascular:      Rate and Rhythm: Normal rate and regular rhythm.      Heart sounds: S1 normal and S2 normal. Murmur heard.     No friction rub. No gallop. No S3 or S4 sounds.   Pulmonary:      Effort: Pulmonary effort is normal. No respiratory distress.      Breath sounds: Normal breath sounds. No stridor. No wheezing or rales.   Chest:      Chest wall: No tenderness.   Abdominal:      General: Bowel sounds are normal. There is no distension.      Palpations: Abdomen is soft. There is no mass.      Tenderness: There is no abdominal tenderness. There is no rebound.   Genitourinary:     Comments: Deferred  Musculoskeletal:         General: No tenderness or deformity. Normal range of motion.      Cervical back: Normal range of motion and neck supple.   Lymphadenopathy:      Cervical: No cervical adenopathy.   Skin:     General: Skin is warm and dry.      Coloration: Skin is not pale.      Findings: No erythema or rash.   Neurological:      Mental Status: He is alert and oriented to person, place, and time.      Motor: No abnormal muscle tone.      Coordination: Coordination normal.   Psychiatric:         Behavior: Behavior normal.         Thought Content: Thought content normal.         Judgment: Judgment normal.         Lab Results   Component Value Date     02/16/2022    K 4.2 02/16/2022     02/16/2022    CO2 29 02/16/2022    BUN 12 02/16/2022    CREATININE 0.8 02/16/2022    GLU 85 02/16/2022    AST 35 02/16/2022    ALT 59 (H) 02/16/2022    ALBUMIN 4.3 02/16/2022    PROT 7.3 02/16/2022    BILITOT 0.7 02/16/2022    WBC 7.73 02/16/2022    HGB 14.5 02/16/2022    HCT 43.0 02/16/2022    MCV 84 02/16/2022     02/16/2022    INR 1.0 12/24/2016    TSH 1.010 11/12/2020    CHOL 186 11/12/2020    HDL 41 11/12/2020    LDLCALC 125 (H) 11/12/2020    TRIG 108 11/12/2020    BNP <10 12/24/2016     Assessment:      1. Bicuspid  aortic valve    2. Nonrheumatic aortic valve insufficiency    3. Thoracic aortic aneurysm without rupture    4. BMI 38.0-38.9,adult        Plan:     1. Bicuspid AV with AI - moderate  - cont to monitor , repeat ECHO 1/2023  - stable now    2. TAA  - stable on recent CT; repeat 12/2022    3. Obesity - BMI 38  - cont weight loss with diet/exercise     Thank you for allowing me to participate in this patient's care. Please do not hesitate to contact me with any questions or concerns. Consult note has been forwarded to the referral physician.

## 2022-03-03 ENCOUNTER — HOSPITAL ENCOUNTER (OUTPATIENT)
Dept: RADIOLOGY | Facility: HOSPITAL | Age: 32
Discharge: HOME | End: 2022-03-03
Attending: FAMILY MEDICINE
Payer: COMMERCIAL

## 2022-03-03 DIAGNOSIS — K80.42 CALCULUS OF BILE DUCT WITH ACUTE CHOLECYSTITIS WITHOUT OBSTRUCTION: ICD-10-CM

## 2022-03-03 PROCEDURE — A9537 TC99M MEBROFENIN: HCPCS

## 2022-03-03 PROCEDURE — 78227 NM HEPATOBILIARY(HIDA) WITH PHARM AND EF: ICD-10-PCS | Mod: 26,,, | Performed by: RADIOLOGY

## 2022-03-03 PROCEDURE — 78227 HEPATOBIL SYST IMAGE W/DRUG: CPT | Mod: 26,,, | Performed by: RADIOLOGY

## 2022-09-15 ENCOUNTER — OFFICE VISIT (OUTPATIENT)
Dept: OTOLARYNGOLOGY | Facility: CLINIC | Age: 32
End: 2022-09-15
Payer: COMMERCIAL

## 2022-09-15 VITALS — BODY MASS INDEX: 39.09 KG/M2 | TEMPERATURE: 98 F | WEIGHT: 249.56 LBS

## 2022-09-15 DIAGNOSIS — H60.501 ACUTE OTITIS EXTERNA OF RIGHT EAR, UNSPECIFIED TYPE: Primary | ICD-10-CM

## 2022-09-15 DIAGNOSIS — H91.90 PERCEIVED HEARING CHANGES: ICD-10-CM

## 2022-09-15 PROCEDURE — 99999 PR PBB SHADOW E&M-EST. PATIENT-LVL III: CPT | Mod: PBBFAC,,, | Performed by: PHYSICIAN ASSISTANT

## 2022-09-15 PROCEDURE — 99203 OFFICE O/P NEW LOW 30 MIN: CPT | Mod: S$GLB,,, | Performed by: PHYSICIAN ASSISTANT

## 2022-09-15 PROCEDURE — 99203 PR OFFICE/OUTPT VISIT, NEW, LEVL III, 30-44 MIN: ICD-10-PCS | Mod: S$GLB,,, | Performed by: PHYSICIAN ASSISTANT

## 2022-09-15 PROCEDURE — 3008F BODY MASS INDEX DOCD: CPT | Mod: CPTII,S$GLB,, | Performed by: PHYSICIAN ASSISTANT

## 2022-09-15 PROCEDURE — 99999 PR PBB SHADOW E&M-EST. PATIENT-LVL III: ICD-10-PCS | Mod: PBBFAC,,, | Performed by: PHYSICIAN ASSISTANT

## 2022-09-15 PROCEDURE — 1159F PR MEDICATION LIST DOCUMENTED IN MEDICAL RECORD: ICD-10-PCS | Mod: CPTII,S$GLB,, | Performed by: PHYSICIAN ASSISTANT

## 2022-09-15 PROCEDURE — 1159F MED LIST DOCD IN RCRD: CPT | Mod: CPTII,S$GLB,, | Performed by: PHYSICIAN ASSISTANT

## 2022-09-15 PROCEDURE — 3008F PR BODY MASS INDEX (BMI) DOCUMENTED: ICD-10-PCS | Mod: CPTII,S$GLB,, | Performed by: PHYSICIAN ASSISTANT

## 2022-09-15 RX ORDER — NEOMYCIN SULFATE, POLYMYXIN B SULFATE AND HYDROCORTISONE 10; 3.5; 1 MG/ML; MG/ML; [USP'U]/ML
3 SUSPENSION/ DROPS AURICULAR (OTIC) 3 TIMES DAILY
Qty: 10 ML | Refills: 0 | Status: SHIPPED | OUTPATIENT
Start: 2022-09-15 | End: 2022-09-25

## 2022-09-16 ENCOUNTER — OFFICE VISIT (OUTPATIENT)
Dept: OTOLARYNGOLOGY | Facility: CLINIC | Age: 32
End: 2022-09-16
Payer: COMMERCIAL

## 2022-09-16 ENCOUNTER — PATIENT MESSAGE (OUTPATIENT)
Dept: OTOLARYNGOLOGY | Facility: CLINIC | Age: 32
End: 2022-09-16

## 2022-09-16 VITALS — WEIGHT: 250.88 LBS | BODY MASS INDEX: 39.29 KG/M2 | TEMPERATURE: 98 F

## 2022-09-16 DIAGNOSIS — H92.01 OTALGIA OF RIGHT EAR: ICD-10-CM

## 2022-09-16 DIAGNOSIS — H91.90 PERCEIVED HEARING CHANGES: ICD-10-CM

## 2022-09-16 DIAGNOSIS — H60.501 ACUTE OTITIS EXTERNA OF RIGHT EAR, UNSPECIFIED TYPE: Primary | ICD-10-CM

## 2022-09-16 PROCEDURE — 1159F MED LIST DOCD IN RCRD: CPT | Mod: CPTII,S$GLB,, | Performed by: PHYSICIAN ASSISTANT

## 2022-09-16 PROCEDURE — 99214 OFFICE O/P EST MOD 30 MIN: CPT | Mod: S$GLB,,, | Performed by: PHYSICIAN ASSISTANT

## 2022-09-16 PROCEDURE — 99214 PR OFFICE/OUTPT VISIT, EST, LEVL IV, 30-39 MIN: ICD-10-PCS | Mod: S$GLB,,, | Performed by: PHYSICIAN ASSISTANT

## 2022-09-16 PROCEDURE — 1159F PR MEDICATION LIST DOCUMENTED IN MEDICAL RECORD: ICD-10-PCS | Mod: CPTII,S$GLB,, | Performed by: PHYSICIAN ASSISTANT

## 2022-09-16 PROCEDURE — 3008F PR BODY MASS INDEX (BMI) DOCUMENTED: ICD-10-PCS | Mod: CPTII,S$GLB,, | Performed by: PHYSICIAN ASSISTANT

## 2022-09-16 PROCEDURE — 99999 PR PBB SHADOW E&M-EST. PATIENT-LVL III: ICD-10-PCS | Mod: PBBFAC,,, | Performed by: PHYSICIAN ASSISTANT

## 2022-09-16 PROCEDURE — 99999 PR PBB SHADOW E&M-EST. PATIENT-LVL III: CPT | Mod: PBBFAC,,, | Performed by: PHYSICIAN ASSISTANT

## 2022-09-16 PROCEDURE — 3008F BODY MASS INDEX DOCD: CPT | Mod: CPTII,S$GLB,, | Performed by: PHYSICIAN ASSISTANT

## 2022-09-16 RX ORDER — AMOXICILLIN AND CLAVULANATE POTASSIUM 875; 125 MG/1; MG/1
1 TABLET, FILM COATED ORAL 2 TIMES DAILY
Qty: 20 TABLET | Refills: 0 | Status: SHIPPED | OUTPATIENT
Start: 2022-09-16 | End: 2022-09-16 | Stop reason: ALTCHOICE

## 2022-09-16 RX ORDER — SULFAMETHOXAZOLE AND TRIMETHOPRIM 800; 160 MG/1; MG/1
1 TABLET ORAL 2 TIMES DAILY
Qty: 20 TABLET | Refills: 0 | Status: SHIPPED | OUTPATIENT
Start: 2022-09-16 | End: 2022-09-26

## 2022-09-16 NOTE — PROGRESS NOTES
Subjective:       Patient ID: Toni Prado is a 32 y.o. male.    Chief Complaint: Ear Fullness (Right side only)    Patient is a very pleasant 32 y.o. male here to see me today for the first time for evaluation of RIGHT ear pain and pressure.  Started about 2 weeks ago while having URI symptoms.  Treated with Sudafed, Ibuprofen and Afrin and was better for short time.  About 2 days ago, he had muffled hearing in the right ear with pain and pressure.  No ear drainage.  Otalgia is worse with chewing or touching external ear.  No fever or facial swelling.   He has not noted any tinnitus in either ear.    He has a family history of hearing loss (father), and had a previous otologic surgery (tubes as child).  He denies any history of significant loud noise exposure. He denies issues with dizziness.  He does not smoke.  He uses Qtips to clean his ears.          Review of Systems   Constitutional:  Negative for activity change, appetite change, fever and unexpected weight change.   HENT:  Positive for ear pain (AD) and hearing loss (AD). Negative for nasal congestion, ear discharge, nosebleeds, rhinorrhea, sinus pressure/congestion, sore throat and tinnitus.    Eyes:  Negative for discharge.   Respiratory:  Negative for cough and shortness of breath.    Cardiovascular:  Negative for chest pain.   Gastrointestinal:  Negative for diarrhea, nausea and vomiting.   Musculoskeletal:  Negative for gait problem.   Allergic/Immunologic: Negative for food allergies.   Neurological:  Negative for dizziness, light-headedness and headaches.   Hematological:  Negative for adenopathy.   Psychiatric/Behavioral:  Negative for confusion.        Objective:      Physical Exam  Vitals reviewed.   Constitutional:       General: He is not in acute distress.     Appearance: He is well-developed.   HENT:      Head: Normocephalic and atraumatic.      Jaw: No trismus.      Right Ear: Tympanic membrane and external ear normal. Drainage (moist  canal), swelling (diffuse erythema and edema of proximal EAC) and tenderness present. No middle ear effusion. No mastoid tenderness. Tympanic membrane is not injected or erythematous.      Left Ear: Hearing, tympanic membrane, ear canal and external ear normal.      Nose: Nose normal. No nasal deformity, mucosal edema, congestion or rhinorrhea.      Mouth/Throat:      Mouth: Mucous membranes are moist.      Dentition: Normal dentition.      Pharynx: Uvula midline. No oropharyngeal exudate or uvula swelling.   Eyes:      General: No scleral icterus.     Conjunctiva/sclera: Conjunctivae normal.      Right eye: Right conjunctiva is not injected. No chemosis.     Left eye: Left conjunctiva is not injected. No chemosis.     Pupils: Pupils are equal, round, and reactive to light.   Neck:      Trachea: Trachea and phonation normal. No tracheal tenderness or tracheal deviation.   Pulmonary:      Effort: Pulmonary effort is normal. No accessory muscle usage or respiratory distress.      Breath sounds: No stridor.   Lymphadenopathy:      Head:      Right side of head: No preauricular or posterior auricular adenopathy.      Left side of head: No preauricular or posterior auricular adenopathy.      Cervical: No cervical adenopathy.      Right cervical: No superficial or deep cervical adenopathy.     Left cervical: No superficial or deep cervical adenopathy.   Skin:     General: Skin is warm and dry.      Findings: No erythema or rash.   Neurological:      Mental Status: He is alert and oriented to person, place, and time.      Cranial Nerves: No cranial nerve deficit.   Psychiatric:         Behavior: Behavior normal. Behavior is cooperative.         Thought Content: Thought content normal.       Assessment:       Problem List Items Addressed This Visit    None  Visit Diagnoses       Acute otitis externa of right ear, unspecified type    -  Primary    Perceived hearing changes                  Plan:           Toni has  evidence of right otitis externa.  We discussed the need for dry ear precautions.  For maintenance therapy, I recommend a mixture of distilled vinegar and alcohol.  For acute infection, antibiotic therapy is needed.  For Toni Prado I recommend Cortisporin drops x 10 days.  Canal edema is not severe enough today for caden but instructed him to call with any worsening.  Will plan to recheck in one week with audiogram.

## 2022-09-16 NOTE — PROGRESS NOTES
Subjective:       Patient ID: Toni Prado is a 32 y.o. male.    Chief Complaint: right ear pain (Pt states pain was a 10 last pm. Could not get comfortable. Not any better today)    Patient is 32 year old male here yesterday for right ear pain.  Diagnosed with OE and started on Cortisporin drops.  He reports worsening right otalgia over night; hard to sleep.  Unsure if drops are getting all the way down into canal.  No fever.  Continues to have muffled hearing on right side.  Denies dizziness.  Canal feels wet but no sushant ear drainage.  Taking Ibuprofen for pain.    Review of Systems   Constitutional:  Negative for fever.   HENT:  Positive for ear discharge, ear pain and hearing loss.    Neurological:  Negative for dizziness.       Objective:      Physical Exam  Constitutional:       Appearance: Normal appearance.   HENT:      Head: Normocephalic and atraumatic.      Right Ear: External ear normal. Drainage (moist), swelling (worse compared to yesterday) and tenderness present.   Neurological:      Mental Status: He is alert.       Assessment:       Problem List Items Addressed This Visit    None  Visit Diagnoses       Acute otitis externa of right ear, unspecified type    -  Primary    Perceived hearing changes        Otalgia of right ear                Plan:         Edema of right EAC is no longer just proximal and has now extended toward the meatus.  Otowick placed today in his right ear and will schedule him for removal/recheck on Monday.  Continue Cortisporin drops; Ciprodex not on formulary.  Will add course of Bactrim DS x 10 days.  He's unable to take oral Cipro/fluoroquinolones due to his aortic aneurysm.   Agree with Ibuprofen for pain.  Dry ear precautions.

## 2022-09-19 ENCOUNTER — OFFICE VISIT (OUTPATIENT)
Dept: OTOLARYNGOLOGY | Facility: CLINIC | Age: 32
End: 2022-09-19
Payer: COMMERCIAL

## 2022-09-19 VITALS — TEMPERATURE: 98 F | DIASTOLIC BLOOD PRESSURE: 93 MMHG | HEART RATE: 75 BPM | SYSTOLIC BLOOD PRESSURE: 157 MMHG

## 2022-09-19 DIAGNOSIS — H91.90 PERCEIVED HEARING CHANGES: ICD-10-CM

## 2022-09-19 DIAGNOSIS — H60.501 ACUTE OTITIS EXTERNA OF RIGHT EAR, UNSPECIFIED TYPE: Primary | ICD-10-CM

## 2022-09-19 PROCEDURE — 3080F DIAST BP >= 90 MM HG: CPT | Mod: CPTII,S$GLB,, | Performed by: OTOLARYNGOLOGY

## 2022-09-19 PROCEDURE — 3077F SYST BP >= 140 MM HG: CPT | Mod: CPTII,S$GLB,, | Performed by: OTOLARYNGOLOGY

## 2022-09-19 PROCEDURE — 99999 PR PBB SHADOW E&M-EST. PATIENT-LVL III: ICD-10-PCS | Mod: PBBFAC,,, | Performed by: OTOLARYNGOLOGY

## 2022-09-19 PROCEDURE — 1159F PR MEDICATION LIST DOCUMENTED IN MEDICAL RECORD: ICD-10-PCS | Mod: CPTII,S$GLB,, | Performed by: OTOLARYNGOLOGY

## 2022-09-19 PROCEDURE — 3080F PR MOST RECENT DIASTOLIC BLOOD PRESSURE >= 90 MM HG: ICD-10-PCS | Mod: CPTII,S$GLB,, | Performed by: OTOLARYNGOLOGY

## 2022-09-19 PROCEDURE — 99999 PR PBB SHADOW E&M-EST. PATIENT-LVL III: CPT | Mod: PBBFAC,,, | Performed by: OTOLARYNGOLOGY

## 2022-09-19 PROCEDURE — 99212 OFFICE O/P EST SF 10 MIN: CPT | Mod: S$GLB,,, | Performed by: OTOLARYNGOLOGY

## 2022-09-19 PROCEDURE — 3077F PR MOST RECENT SYSTOLIC BLOOD PRESSURE >= 140 MM HG: ICD-10-PCS | Mod: CPTII,S$GLB,, | Performed by: OTOLARYNGOLOGY

## 2022-09-19 PROCEDURE — 99212 PR OFFICE/OUTPT VISIT, EST, LEVL II, 10-19 MIN: ICD-10-PCS | Mod: S$GLB,,, | Performed by: OTOLARYNGOLOGY

## 2022-09-19 PROCEDURE — 1159F MED LIST DOCD IN RCRD: CPT | Mod: CPTII,S$GLB,, | Performed by: OTOLARYNGOLOGY

## 2022-09-19 RX ORDER — AMOXICILLIN AND CLAVULANATE POTASSIUM 875; 125 MG/1; MG/1
1 TABLET, FILM COATED ORAL 2 TIMES DAILY
COMMUNITY
Start: 2022-09-16

## 2022-09-19 NOTE — PROGRESS NOTES
Referring Provider:    No referring provider defined for this encounter.  Subjective:   Patient: Toni Prado [id number], :1990   Visit date:2022 10:52 AM    Chief Complaint:  Hearing Loss, Otalgia, and Follow-up (Follow -up  from ear wick placed by Willie on Friday. He's pretty sure the wick came out Saturday. Ear pain is improved but hearing loss has not improved)    HPI:    Prior notes reviewed by myself.  Clinical documentation obtained by nursing staff reviewed.     33 y/o gentleman here for follow-up on right otitis externa.  He was seen by another 1 of our providers at the end of last week and an ear wick was placed.  He states that the pain has subsequently improved and that the ear wick came out over the weekend.  He was able to autoinsufflate today and felt as if his hearing did improve.  He is also scheduled to see our PA and have an audiogram on Thursday      Objective:     Physical Exam:  Vitals:  BP (!) 157/93   Pulse 75   Temp 97.5 °F (36.4 °C) (Temporal)   General appearance:  Well developed, well nourished    Ears:  Otoscopy of external auditory canals and tympanic membranes was normal left and mild edema with residual drops near TM on the right, clinical speech reception thresholds grossly intact, no mass/lesion of auricle.    Nose:  No masses/lesions of external nose, nasal mucosa, septum, and turbinates were within normal limits.    Mouth:  No mass/lesion of lips, teeth, gums, hard/soft palate, tongue, tonsils, or oropharynx.    Neck & Lymphatics:  No cervical lymphadenopathy, no neck mass/crepitus/ asymmetry, trachea is midline, no thyroid enlargement/tenderness/mass.        [x]  Data Reviewed:    Lab Results   Component Value Date    WBC 7.73 2022    HGB 14.5 2022    HCT 43.0 2022    MCV 84 2022    EOSINOPHIL 3.6 2022             Assessment & Plan:   Acute otitis externa of right ear, unspecified type    Perceived hearing changes      Right otitis  externa improved significantly since last week.  He is going to keep his appointment on Thursday to have an audiogram.  I recommended that he continue autoinsufflation t.i.d..

## 2022-11-29 DIAGNOSIS — I35.1 NONRHEUMATIC AORTIC VALVE INSUFFICIENCY: Primary | ICD-10-CM

## 2022-12-02 ENCOUNTER — OFFICE VISIT (OUTPATIENT)
Dept: CARDIOLOGY | Facility: CLINIC | Age: 32
End: 2022-12-02
Payer: COMMERCIAL

## 2022-12-02 ENCOUNTER — HOSPITAL ENCOUNTER (OUTPATIENT)
Dept: CARDIOLOGY | Facility: HOSPITAL | Age: 32
Discharge: HOME | End: 2022-12-02
Attending: INTERNAL MEDICINE
Payer: COMMERCIAL

## 2022-12-02 VITALS
DIASTOLIC BLOOD PRESSURE: 82 MMHG | WEIGHT: 240 LBS | BODY MASS INDEX: 37.67 KG/M2 | SYSTOLIC BLOOD PRESSURE: 138 MMHG | OXYGEN SATURATION: 98 % | HEART RATE: 88 BPM | HEIGHT: 67 IN

## 2022-12-02 DIAGNOSIS — Q23.1 BICUSPID AORTIC VALVE: ICD-10-CM

## 2022-12-02 DIAGNOSIS — I71.21 ANEURYSM OF ASCENDING AORTA WITHOUT RUPTURE: Primary | ICD-10-CM

## 2022-12-02 DIAGNOSIS — I35.1 NONRHEUMATIC AORTIC VALVE INSUFFICIENCY: ICD-10-CM

## 2022-12-02 DIAGNOSIS — I35.8 OTHER NONRHEUMATIC AORTIC VALVE DISORDERS: ICD-10-CM

## 2022-12-02 PROCEDURE — 93005 ELECTROCARDIOGRAM TRACING: CPT

## 2022-12-02 PROCEDURE — 1159F PR MEDICATION LIST DOCUMENTED IN MEDICAL RECORD: ICD-10-PCS | Mod: CPTII,S$GLB,, | Performed by: INTERNAL MEDICINE

## 2022-12-02 PROCEDURE — 3008F PR BODY MASS INDEX (BMI) DOCUMENTED: ICD-10-PCS | Mod: CPTII,S$GLB,, | Performed by: INTERNAL MEDICINE

## 2022-12-02 PROCEDURE — 99999 PR PBB SHADOW E&M-EST. PATIENT-LVL III: ICD-10-PCS | Mod: PBBFAC,,, | Performed by: INTERNAL MEDICINE

## 2022-12-02 PROCEDURE — 1159F MED LIST DOCD IN RCRD: CPT | Mod: CPTII,S$GLB,, | Performed by: INTERNAL MEDICINE

## 2022-12-02 PROCEDURE — 3079F DIAST BP 80-89 MM HG: CPT | Mod: CPTII,S$GLB,, | Performed by: INTERNAL MEDICINE

## 2022-12-02 PROCEDURE — 3079F PR MOST RECENT DIASTOLIC BLOOD PRESSURE 80-89 MM HG: ICD-10-PCS | Mod: CPTII,S$GLB,, | Performed by: INTERNAL MEDICINE

## 2022-12-02 PROCEDURE — 1160F RVW MEDS BY RX/DR IN RCRD: CPT | Mod: CPTII,S$GLB,, | Performed by: INTERNAL MEDICINE

## 2022-12-02 PROCEDURE — 3075F SYST BP GE 130 - 139MM HG: CPT | Mod: CPTII,S$GLB,, | Performed by: INTERNAL MEDICINE

## 2022-12-02 PROCEDURE — 99214 OFFICE O/P EST MOD 30 MIN: CPT | Mod: S$GLB,,, | Performed by: INTERNAL MEDICINE

## 2022-12-02 PROCEDURE — 99999 PR PBB SHADOW E&M-EST. PATIENT-LVL III: CPT | Mod: PBBFAC,,, | Performed by: INTERNAL MEDICINE

## 2022-12-02 PROCEDURE — 99214 PR OFFICE/OUTPT VISIT, EST, LEVL IV, 30-39 MIN: ICD-10-PCS | Mod: S$GLB,,, | Performed by: INTERNAL MEDICINE

## 2022-12-02 PROCEDURE — 93010 EKG 12-LEAD: ICD-10-PCS | Mod: ,,, | Performed by: INTERNAL MEDICINE

## 2022-12-02 PROCEDURE — 1160F PR REVIEW ALL MEDS BY PRESCRIBER/CLIN PHARMACIST DOCUMENTED: ICD-10-PCS | Mod: CPTII,S$GLB,, | Performed by: INTERNAL MEDICINE

## 2022-12-02 PROCEDURE — 3008F BODY MASS INDEX DOCD: CPT | Mod: CPTII,S$GLB,, | Performed by: INTERNAL MEDICINE

## 2022-12-02 PROCEDURE — 3075F PR MOST RECENT SYSTOLIC BLOOD PRESS GE 130-139MM HG: ICD-10-PCS | Mod: CPTII,S$GLB,, | Performed by: INTERNAL MEDICINE

## 2022-12-02 PROCEDURE — 93010 ELECTROCARDIOGRAM REPORT: CPT | Mod: ,,, | Performed by: INTERNAL MEDICINE

## 2022-12-02 NOTE — PROGRESS NOTES
Subjective:   Patient ID:  Toni Prado is a 32 y.o. male who presents for cardiac consult of No chief complaint on file.      HPI  The patient came in today for cardiac consult of No chief complaint on file.        Toni Prado is a 32 y.o. male pt with bicuspid AV with mod AI, mild aortic root dilation, obesity presents for CV follow up.       Pt seen by Dr. Boyd here to establish care. Pt had CT chest in Dec 2021 with unchanged TAA 4.2 cm. ECHO in Jan with mod AI.     3/1/22  He had mild viral GI issues, had fatty liver diagnosed. He will have HIDA scan as well. He has been having pain a short while after eating.     12/2/2  BP and Hr well controlled. BMI 27 - 240 lbs. Overall doing well, weight stable.     Patient feels  no chest pain, no sob, no leg swelling, no PND, no palpitation, no dizziness, no syncope, no CNS symptoms.    Patient has fairly good exercise tolerance. Manager at [employer].     Patient is compliant with medications.    FH - father - HTN, HLD    Impression:  Unchanged mild fusiform aneurysmal dilatation of the ascending thoracic aorta measuring up to 4.2 cm in diameter.     Electronically signed by: Ravindra Araiza MD  Date:                                            12/20/2021    Results for orders placed during the hospital encounter of 01/06/22    Echo    Interpretation Summary  · The aortic valve is bileaflet.  · Moderate aortic regurgitation.  · The ascending aorta is mildly dilated.  · Normal systolic function.  · The estimated ejection fraction is 55%.  · Normal left ventricular diastolic function.  · With normal right ventricular systolic function.  · Normal central venous pressure (3 mmHg).  · The estimated PA systolic pressure is 26 mmHg.      Past Medical History:   Diagnosis Date    Heart murmur     History of vasectomy 03/20/2021    Stenosis     Valvular regurgitation     BAV-- MOD AR.       History reviewed. No pertinent surgical history.    Social  "History     Tobacco Use    Smoking status: Never    Smokeless tobacco: Never   Substance Use Topics    Alcohol use: Yes     Comment: occassionally    Drug use: No       Family History   Problem Relation Age of Onset    Hypertension Father        Patient's Medications   New Prescriptions    No medications on file   Previous Medications    AMOXICILLIN-CLAVULANATE 875-125MG (AUGMENTIN) 875-125 MG PER TABLET    Take 1 tablet by mouth 2 (two) times daily.    SUMATRIPTAN (IMITREX) 50 MG TABLET    Take 1 tablet (50 mg total) by mouth daily as needed for Migraine. May repeat in 2 hours prn, max 2 per 24 hours.   Modified Medications    No medications on file   Discontinued Medications    No medications on file       Review of Systems   Constitutional: Negative.    HENT: Negative.     Respiratory: Negative.     Cardiovascular: Negative.    Gastrointestinal:  Positive for abdominal pain and heartburn. Negative for nausea.   Genitourinary: Negative.    Musculoskeletal: Negative.    Skin: Negative.    Neurological: Negative.    Endo/Heme/Allergies: Negative.    Psychiatric/Behavioral: Negative.       Wt Readings from Last 3 Encounters:   12/02/22 108.9 kg (240 lb)   09/16/22 113.8 kg (250 lb 14.1 oz)   09/15/22 113.2 kg (249 lb 9 oz)     Temp Readings from Last 3 Encounters:   09/19/22 97.5 °F (36.4 °C) (Temporal)   09/16/22 98.1 °F (36.7 °C) (Temporal)   09/15/22 98.1 °F (36.7 °C) (Temporal)     BP Readings from Last 3 Encounters:   12/02/22 138/82   09/19/22 (!) 157/93   03/01/22 135/75     Pulse Readings from Last 3 Encounters:   12/02/22 88   09/19/22 75   03/01/22 81       /82   Pulse 88   Ht 5' 7" (1.702 m)   Wt 108.9 kg (240 lb)   SpO2 98%   BMI 37.59 kg/m²     Objective:   Physical Exam  Vitals and nursing note reviewed.   Constitutional:       General: He is not in acute distress.     Appearance: He is well-developed. He is not diaphoretic.   HENT:      Head: Normocephalic and atraumatic.      Nose: Nose " normal.   Eyes:      General: No scleral icterus.     Conjunctiva/sclera: Conjunctivae normal.   Neck:      Thyroid: No thyromegaly.      Vascular: No JVD.   Cardiovascular:      Rate and Rhythm: Normal rate and regular rhythm.      Heart sounds: S1 normal and S2 normal. Murmur heard.     No friction rub. No gallop. No S3 or S4 sounds.   Pulmonary:      Effort: Pulmonary effort is normal. No respiratory distress.      Breath sounds: Normal breath sounds. No stridor. No wheezing or rales.   Chest:      Chest wall: No tenderness.   Abdominal:      General: Bowel sounds are normal. There is no distension.      Palpations: Abdomen is soft. There is no mass.      Tenderness: There is no abdominal tenderness. There is no rebound.   Genitourinary:     Comments: Deferred  Musculoskeletal:         General: No tenderness or deformity. Normal range of motion.      Cervical back: Normal range of motion and neck supple.   Lymphadenopathy:      Cervical: No cervical adenopathy.   Skin:     General: Skin is warm and dry.      Coloration: Skin is not pale.      Findings: No erythema or rash.   Neurological:      Mental Status: He is alert and oriented to person, place, and time.      Motor: No abnormal muscle tone.      Coordination: Coordination normal.   Psychiatric:         Behavior: Behavior normal.         Thought Content: Thought content normal.         Judgment: Judgment normal.       Lab Results   Component Value Date     02/16/2022    K 4.2 02/16/2022     02/16/2022    CO2 29 02/16/2022    BUN 12 02/16/2022    CREATININE 0.8 02/16/2022    GLU 85 02/16/2022    AST 35 02/16/2022    ALT 59 (H) 02/16/2022    ALBUMIN 4.3 02/16/2022    PROT 7.3 02/16/2022    BILITOT 0.7 02/16/2022    WBC 7.73 02/16/2022    HGB 14.5 02/16/2022    HCT 43.0 02/16/2022    MCV 84 02/16/2022     02/16/2022    INR 1.0 12/24/2016    TSH 1.010 11/12/2020    CHOL 186 11/12/2020    HDL 41 11/12/2020    LDLCALC 125 (H) 11/12/2020    TRIG  108 11/12/2020    BNP <10 12/24/2016     Assessment:      1. Aneurysm of ascending aorta without rupture    2. Nonrheumatic aortic valve insufficiency    3. Bicuspid aortic valve    4. Other nonrheumatic aortic valve disorders    5. BMI 38.0-38.9,adult          Plan:     1. Bicuspid AV with AI - moderate  - cont to monitor, repeat ECHO 1/2023  - stable now    2. TAA  - stable on CT 12/201; repeat ordered   - unchanged TAA 4.2 cm.    3. Obesity - BMI 38 --> 37; fatty liver, GERD sx   - cont weight loss with diet/exercise     Thank you for allowing me to participate in this patient's care. Please do not hesitate to contact me with any questions or concerns. Consult note has been forwarded to the referral physician.

## 2022-12-12 ENCOUNTER — HOSPITAL ENCOUNTER (OUTPATIENT)
Dept: CARDIOLOGY | Facility: HOSPITAL | Age: 32
Discharge: HOME | End: 2022-12-12
Attending: INTERNAL MEDICINE
Payer: COMMERCIAL

## 2022-12-12 ENCOUNTER — HOSPITAL ENCOUNTER (OUTPATIENT)
Dept: RADIOLOGY | Facility: HOSPITAL | Age: 32
Discharge: HOME | End: 2022-12-12
Attending: INTERNAL MEDICINE
Payer: COMMERCIAL

## 2022-12-12 VITALS
BODY MASS INDEX: 37.67 KG/M2 | SYSTOLIC BLOOD PRESSURE: 138 MMHG | WEIGHT: 240 LBS | HEIGHT: 67 IN | DIASTOLIC BLOOD PRESSURE: 82 MMHG

## 2022-12-12 DIAGNOSIS — I35.1 NONRHEUMATIC AORTIC VALVE INSUFFICIENCY: ICD-10-CM

## 2022-12-12 DIAGNOSIS — I71.21 ANEURYSM OF ASCENDING AORTA WITHOUT RUPTURE: ICD-10-CM

## 2022-12-12 DIAGNOSIS — I35.8 OTHER NONRHEUMATIC AORTIC VALVE DISORDERS: ICD-10-CM

## 2022-12-12 DIAGNOSIS — Q23.1 BICUSPID AORTIC VALVE: ICD-10-CM

## 2022-12-12 LAB
AORTIC ROOT ANNULUS: 4.04 CM
ASCENDING AORTA: 4.01 CM
AV INDEX (PROSTH): 0.64
AV MEAN GRADIENT: 9 MMHG
AV PEAK GRADIENT: 14 MMHG
AV REGURGITATION PRESSURE HALF TIME: 431.28 MS
AV VALVE AREA: 2.79 CM2
AV VELOCITY RATIO: 0.59
BSA FOR ECHO PROCEDURE: 2.27 M2
CV ECHO LV RWT: 0.43 CM
DOP CALC AO PEAK VEL: 1.9 M/S
DOP CALC AO VTI: 45.4 CM
DOP CALC LVOT AREA: 4.4 CM2
DOP CALC LVOT DIAMETER: 2.36 CM
DOP CALC LVOT PEAK VEL: 1.13 M/S
DOP CALC LVOT STROKE VOLUME: 126.79 CM3
DOP CALC RVOT PEAK VEL: 0.61 M/S
DOP CALC RVOT VTI: 13.7 CM
DOP CALCLVOT PEAK VEL VTI: 29 CM
E WAVE DECELERATION TIME: 202.71 MSEC
E/A RATIO: 1.43
E/E' RATIO: 7.91 M/S
ECHO LV POSTERIOR WALL: 1.14 CM (ref 0.6–1.1)
EJECTION FRACTION: 55 %
FRACTIONAL SHORTENING: 34 % (ref 28–44)
INTERVENTRICULAR SEPTUM: 1.05 CM (ref 0.6–1.1)
IVC DIAMETER: 1.7 CM
IVRT: 79.92 MSEC
LA MAJOR: 4.43 CM
LA MINOR: 4.43 CM
LA WIDTH: 4 CM
LEFT ATRIUM SIZE: 4.03 CM
LEFT ATRIUM VOLUME INDEX MOD: 19.3 ML/M2
LEFT ATRIUM VOLUME INDEX: 27.8 ML/M2
LEFT ATRIUM VOLUME MOD: 42.09 CM3
LEFT ATRIUM VOLUME: 60.7 CM3
LEFT INTERNAL DIMENSION IN SYSTOLE: 3.53 CM (ref 2.1–4)
LEFT VENTRICLE DIASTOLIC VOLUME INDEX: 63.07 ML/M2
LEFT VENTRICLE DIASTOLIC VOLUME: 137.5 ML
LEFT VENTRICLE MASS INDEX: 105 G/M2
LEFT VENTRICLE SYSTOLIC VOLUME INDEX: 23.8 ML/M2
LEFT VENTRICLE SYSTOLIC VOLUME: 51.95 ML
LEFT VENTRICULAR INTERNAL DIMENSION IN DIASTOLE: 5.34 CM (ref 3.5–6)
LEFT VENTRICULAR MASS: 229.14 G
LV LATERAL E/E' RATIO: 7.25 M/S
LV SEPTAL E/E' RATIO: 8.7 M/S
LVOT MG: 2.54 MMHG
LVOT MV: 0.73 CM/S
MV PEAK A VEL: 0.61 M/S
MV PEAK E VEL: 0.87 M/S
MV STENOSIS PRESSURE HALF TIME: 58.79 MS
MV VALVE AREA P 1/2 METHOD: 3.74 CM2
PISA AR MAX VEL: 4.57 M/S
PISA TR MAX VEL: 2.19 M/S
PV MEAN GRADIENT: 0.84 MMHG
PV PEAK VELOCITY: 1.06 CM/S
RA MAJOR: 3.77 CM
RA PRESSURE: 8 MMHG
RA WIDTH: 3.21 CM
RIGHT VENTRICULAR END-DIASTOLIC DIMENSION: 3.37 CM
SINUS: 3.98 CM
STJ: 3.74 CM
TDI LATERAL: 0.12 M/S
TDI SEPTAL: 0.1 M/S
TDI: 0.11 M/S
TR MAX PG: 19 MMHG
TRICUSPID ANNULAR PLANE SYSTOLIC EXCURSION: 2.41 CM
TV REST PULMONARY ARTERY PRESSURE: 27 MMHG

## 2022-12-12 PROCEDURE — 71275 CT ANGIOGRAPHY CHEST: CPT | Mod: 26,,, | Performed by: STUDENT IN AN ORGANIZED HEALTH CARE EDUCATION/TRAINING PROGRAM

## 2022-12-12 PROCEDURE — 71275 CT ANGIOGRAPHY CHEST: CPT | Mod: TC

## 2022-12-12 PROCEDURE — 93306 TTE W/DOPPLER COMPLETE: CPT | Mod: 26,,, | Performed by: INTERNAL MEDICINE

## 2022-12-12 PROCEDURE — 71275 CTA CHEST AORTA NON CORONARY: ICD-10-PCS | Mod: 26,,, | Performed by: STUDENT IN AN ORGANIZED HEALTH CARE EDUCATION/TRAINING PROGRAM

## 2022-12-12 PROCEDURE — 93306 ECHO (CUPID ONLY): ICD-10-PCS | Mod: 26,,, | Performed by: INTERNAL MEDICINE

## 2022-12-12 PROCEDURE — 93306 TTE W/DOPPLER COMPLETE: CPT

## 2022-12-12 PROCEDURE — 25500020 PHARM REV CODE 255: Performed by: INTERNAL MEDICINE

## 2022-12-12 RX ADMIN — IOHEXOL 100 ML: 350 INJECTION, SOLUTION INTRAVENOUS at 02:12

## 2023-04-11 ENCOUNTER — HOSPITAL ENCOUNTER (EMERGENCY)
Facility: HOSPITAL | Age: 33
Discharge: HOME | End: 2023-04-11
Attending: EMERGENCY MEDICINE
Payer: COMMERCIAL

## 2023-04-11 VITALS
RESPIRATION RATE: 17 BRPM | WEIGHT: 241.63 LBS | HEART RATE: 72 BPM | HEIGHT: 67 IN | DIASTOLIC BLOOD PRESSURE: 72 MMHG | SYSTOLIC BLOOD PRESSURE: 128 MMHG | BODY MASS INDEX: 37.92 KG/M2 | TEMPERATURE: 98 F | OXYGEN SATURATION: 95 %

## 2023-04-11 DIAGNOSIS — I71.21 ANEURYSM OF ASCENDING AORTA WITHOUT RUPTURE: ICD-10-CM

## 2023-04-11 DIAGNOSIS — R07.9 CHEST PAIN, UNSPECIFIED TYPE: Primary | ICD-10-CM

## 2023-04-11 LAB
ALBUMIN SERPL BCP-MCNC: 4.3 G/DL (ref 3.5–5.2)
ALP SERPL-CCNC: 75 U/L (ref 55–135)
ALT SERPL W/O P-5'-P-CCNC: 56 U/L (ref 10–44)
ANION GAP SERPL CALC-SCNC: 8 MMOL/L (ref 8–16)
AST SERPL-CCNC: 36 U/L (ref 10–40)
BASOPHILS # BLD AUTO: 0.04 K/UL (ref 0–0.2)
BASOPHILS NFR BLD: 0.5 % (ref 0–1.9)
BILIRUB SERPL-MCNC: 0.7 MG/DL (ref 0.1–1)
BUN SERPL-MCNC: 9 MG/DL (ref 6–20)
CALCIUM SERPL-MCNC: 9.4 MG/DL (ref 8.7–10.5)
CHLORIDE SERPL-SCNC: 106 MMOL/L (ref 95–110)
CO2 SERPL-SCNC: 24 MMOL/L (ref 23–29)
CREAT SERPL-MCNC: 0.8 MG/DL (ref 0.5–1.4)
DIFFERENTIAL METHOD: NORMAL
EOSINOPHIL # BLD AUTO: 0.3 K/UL (ref 0–0.5)
EOSINOPHIL NFR BLD: 3 % (ref 0–8)
ERYTHROCYTE [DISTWIDTH] IN BLOOD BY AUTOMATED COUNT: 13 % (ref 11.5–14.5)
EST. GFR  (NO RACE VARIABLE): >60 ML/MIN/1.73 M^2
GLUCOSE SERPL-MCNC: 86 MG/DL (ref 70–110)
HCT VFR BLD AUTO: 44.4 % (ref 40–54)
HGB BLD-MCNC: 15 G/DL (ref 14–18)
IMM GRANULOCYTES # BLD AUTO: 0.02 K/UL (ref 0–0.04)
IMM GRANULOCYTES NFR BLD AUTO: 0.2 % (ref 0–0.5)
LYMPHOCYTES # BLD AUTO: 3.6 K/UL (ref 1–4.8)
LYMPHOCYTES NFR BLD: 44 % (ref 18–48)
MCH RBC QN AUTO: 27.5 PG (ref 27–31)
MCHC RBC AUTO-ENTMCNC: 33.8 G/DL (ref 32–36)
MCV RBC AUTO: 82 FL (ref 82–98)
MONOCYTES # BLD AUTO: 0.5 K/UL (ref 0.3–1)
MONOCYTES NFR BLD: 6.6 % (ref 4–15)
NEUTROPHILS # BLD AUTO: 3.8 K/UL (ref 1.8–7.7)
NEUTROPHILS NFR BLD: 45.7 % (ref 38–73)
NRBC BLD-RTO: 0 /100 WBC
PLATELET # BLD AUTO: 260 K/UL (ref 150–450)
PMV BLD AUTO: 9.6 FL (ref 9.2–12.9)
POTASSIUM SERPL-SCNC: 4.6 MMOL/L (ref 3.5–5.1)
PROT SERPL-MCNC: 7.3 G/DL (ref 6–8.4)
RBC # BLD AUTO: 5.45 M/UL (ref 4.6–6.2)
SODIUM SERPL-SCNC: 138 MMOL/L (ref 136–145)
TROPONIN I SERPL DL<=0.01 NG/ML-MCNC: <0.006 NG/ML (ref 0–0.03)
WBC # BLD AUTO: 8.22 K/UL (ref 3.9–12.7)

## 2023-04-11 PROCEDURE — 99285 EMERGENCY DEPT VISIT HI MDM: CPT | Mod: 25

## 2023-04-11 PROCEDURE — 25500020 PHARM REV CODE 255: Performed by: EMERGENCY MEDICINE

## 2023-04-11 PROCEDURE — 85025 COMPLETE CBC W/AUTO DIFF WBC: CPT | Performed by: EMERGENCY MEDICINE

## 2023-04-11 PROCEDURE — 80053 COMPREHEN METABOLIC PANEL: CPT | Performed by: EMERGENCY MEDICINE

## 2023-04-11 PROCEDURE — 84484 ASSAY OF TROPONIN QUANT: CPT | Performed by: EMERGENCY MEDICINE

## 2023-04-11 RX ADMIN — IOHEXOL 100 ML: 350 INJECTION, SOLUTION INTRAVENOUS at 12:04

## 2023-04-11 NOTE — ED PROVIDER NOTES
"SCRIBE #1 NOTE: I, Rogerio Doyle/Bebe Saunders, am scribing for, and in the presence of, Damien Manzanares MD. I have scribed the entire note.       History     Chief Complaint   Patient presents with    Chest Pain     Pt CO SS CP he describes as "discomfort" that started earlier this am that radiates to back. Pt seen by PCP yesterday. Pt recently dx with pneumonia at  and treated with abx. No SOB, discomfort on deep insp. Denies flu like S&S     Review of patient's allergies indicates:  No Known Allergies      History of Present Illness     HPI    4/11/2023, 10:07 AM  History obtained from the patient      History of Present Illness: Toni Prado is a 33 y.o. male patient with a PMHx of heart murmur, stenosis, and valvular regurgitation, who presents to the Emergency Department for evaluation of L-sided chest pain described as a pressure which onset at 05:45 this morning. Pt states that his symptoms are now improving. Pt also has a known thoracic aortic aneurism that is monitored yearly. His symptoms are constant and moderate in severity.  Associated sxs include stabbing L upper back pain that worsens with deep breaths and a cough. Pt states that the cough has been present for about 4 weeks and has been treated with doxycycline, steroids, inhaler, and cough suppressants with improvement. Patient denies any fever, n/v/d, abdominal pain, SOB, headache, and all other sxs at this time. No prior Tx reported. No further complaints or concerns at this time.       Arrival mode: Personal vehicle     PCP: Claude Castillo MD        Past Medical History:  Past Medical History:   Diagnosis Date    Heart murmur     History of vasectomy 03/20/2021    Stenosis     Valvular regurgitation     BAV-- MOD AR.       Past Surgical History:  No past surgical history on file.      Family History:  Family History   Problem Relation Age of Onset    Hypertension Father        Social History:  Social History     Tobacco Use    Smoking " status: Never    Smokeless tobacco: Never   Substance and Sexual Activity    Alcohol use: Yes     Comment: occassionally    Drug use: No    Sexual activity: Yes     Partners: Female        Review of Systems     Review of Systems   Constitutional:  Negative for fever.   HENT:  Negative for sore throat.    Respiratory:  Positive for cough. Negative for shortness of breath.    Cardiovascular:  Positive for chest pain (L-sided pressure).   Gastrointestinal:  Negative for abdominal pain, diarrhea, nausea and vomiting.   Genitourinary:  Negative for dysuria.   Musculoskeletal:  Positive for back pain (L-upper described as a stabbing pain).   Skin:  Negative for rash.   Neurological:  Negative for weakness and headaches.   Hematological:  Does not bruise/bleed easily.   All other systems reviewed and are negative.     Physical Exam     Initial Vitals [04/11/23 0627]   BP Pulse Resp Temp SpO2   (!) 147/84 97 16 98.3 °F (36.8 °C) 98 %      MAP       --          Physical Exam  Nursing Notes and Vital Signs Reviewed.  Constitutional: Patient is in no acute distress. Well-developed and well-nourished.  Head: Atraumatic. Normocephalic.  Eyes: PERRL. EOM intact. Conjunctivae are not pale. No scleral icterus.  ENT: Mucous membranes are moist. Oropharynx is clear and symmetric.    Neck: Supple. Full ROM. No lymphadenopathy.  Cardiovascular: Regular rate. Regular rhythm. No murmurs, rubs, or gallops. Distal pulses are 2+ and symmetric.  Pulmonary/Chest: No respiratory distress. Clear to auscultation bilaterally. No wheezing or rales.  Abdominal: Soft and non-distended.  There is no tenderness.  No rebound, guarding, or rigidity.   Musculoskeletal: Moves all extremities. No obvious deformities. No edema. No calf tenderness.  Skin: Warm and dry.  Neurological:  Alert, awake, and appropriate.  Normal speech.  No acute focal neurological deficits are appreciated.  Psychiatric: Normal affect. Good eye contact. Appropriate in content.      "ED Course   Procedures  ED Vital Signs:  Vitals:    04/11/23 0627 04/11/23 0845 04/11/23 0900 04/11/23 1000   BP: (!) 147/84 138/70 131/62 134/60   Pulse: 97 70 67 74   Resp: 16 19 17 20   Temp: 98.3 °F (36.8 °C) 98.5 °F (36.9 °C)     TempSrc: Oral Oral     SpO2: 98% 100% 100% 99%   Weight: 109.6 kg (241 lb 10 oz)      Height: 5' 7" (1.702 m)       04/11/23 1100 04/11/23 1230 04/11/23 1330 04/11/23 1400   BP: 131/67 129/66 129/74 128/72   Pulse: 74 71 80 72   Resp: (!) 22 11 20 17   Temp:    98.4 °F (36.9 °C)   TempSrc:    Oral   SpO2: 99% 97% 98% 95%   Weight:       Height:           Abnormal Lab Results:  Labs Reviewed   COMPREHENSIVE METABOLIC PANEL - Abnormal; Notable for the following components:       Result Value    ALT 56 (*)     All other components within normal limits   CBC W/ AUTO DIFFERENTIAL   TROPONIN I        All Lab Results:  Results for orders placed or performed during the hospital encounter of 04/11/23   Comprehensive metabolic panel   Result Value Ref Range    Sodium 138 136 - 145 mmol/L    Potassium 4.6 3.5 - 5.1 mmol/L    Chloride 106 95 - 110 mmol/L    CO2 24 23 - 29 mmol/L    Glucose 86 70 - 110 mg/dL    BUN 9 6 - 20 mg/dL    Creatinine 0.8 0.5 - 1.4 mg/dL    Calcium 9.4 8.7 - 10.5 mg/dL    Total Protein 7.3 6.0 - 8.4 g/dL    Albumin 4.3 3.5 - 5.2 g/dL    Total Bilirubin 0.7 0.1 - 1.0 mg/dL    Alkaline Phosphatase 75 55 - 135 U/L    AST 36 10 - 40 U/L    ALT 56 (H) 10 - 44 U/L    Anion Gap 8 8 - 16 mmol/L    eGFR >60 >60 mL/min/1.73 m^2   CBC auto differential   Result Value Ref Range    WBC 8.22 3.90 - 12.70 K/uL    RBC 5.45 4.60 - 6.20 M/uL    Hemoglobin 15.0 14.0 - 18.0 g/dL    Hematocrit 44.4 40.0 - 54.0 %    MCV 82 82 - 98 fL    MCH 27.5 27.0 - 31.0 pg    MCHC 33.8 32.0 - 36.0 g/dL    RDW 13.0 11.5 - 14.5 %    Platelets 260 150 - 450 K/uL    MPV 9.6 9.2 - 12.9 fL    Immature Granulocytes 0.2 0.0 - 0.5 %    Gran # (ANC) 3.8 1.8 - 7.7 K/uL    Immature Grans (Abs) 0.02 0.00 - 0.04 K/uL    " Lymph # 3.6 1.0 - 4.8 K/uL    Mono # 0.5 0.3 - 1.0 K/uL    Eos # 0.3 0.0 - 0.5 K/uL    Baso # 0.04 0.00 - 0.20 K/uL    nRBC 0 0 /100 WBC    Gran % 45.7 38.0 - 73.0 %    Lymph % 44.0 18.0 - 48.0 %    Mono % 6.6 4.0 - 15.0 %    Eosinophil % 3.0 0.0 - 8.0 %    Basophil % 0.5 0.0 - 1.9 %    Differential Method Automated    Troponin I   Result Value Ref Range    Troponin I <0.006 0.000 - 0.026 ng/mL         Imaging Results:  Imaging Results              CTA Chest Abdomen Non Coronary (Final result)  Result time 04/11/23 12:41:33      Final result by EVARISTO Christiansen Sr., MD (04/11/23 12:41:33)                   Impression:      1. The ascending thoracic aorta has an AP diameter of 4.4 cm on the current examination.  On the prior examination it had an AP diameter of 4.2 cm.  This is consistent with the patient's history.  2. There has been interval development of mild amount of haziness in the posterior aspect of the right lower lobe.  This is characteristic of atelectasis or pneumonia.  3.  There has been interval development of a streaky opacity in the inferior aspect of the left lower lobe.  This is characteristic of subsegmental atelectasis or scarring.  4. There is a small fat filled umbilical hernia. The width of the mouth of this hernia measures 6 mm.  All CT scans at this facility use dose modulation, iterative reconstruction, and/or weight base dosing when appropriate to reduce radiation dose when appropriate to reduce radiation dose to as low as reasonably achievable.      Electronically signed by: Conner Christiansen MD  Date:    04/11/2023  Time:    12:41               Narrative:    EXAMINATION:  CTA CHEST ABDOMEN NON CORONARY (XPD)    CLINICAL HISTORY:  Aortic aneurysm, known or suspected;    TECHNIQUE:  Standard chest, abdomen, and pelvis CTA protocol was performed with IV contrast and 3D MIP reformats.  100 mL of Omnipaque 350 contrast material was used for this examination.  There was no oral contrast  administered.    COMPARISON:  Comparison was made to CTA of the chest performed on 12/12/2022.    FINDINGS:  The ascending thoracic aorta has an AP diameter of 4.4 cm on the current examination.  On the prior examination it had an AP diameter of 4.2 cm.  There is no dissection.  The abdominal aorta, celiac trunk, superior mesenteric artery, renal arteries, and inferior mesenteric artery are normal in appearance.    The size of the heart is within normal limits.  There has been interval development of mild amount of haziness in the posterior aspect of the right lower lobe.  There has been interval development of a streaky opacity in the inferior aspect of the left lower lobe.  There is no pneumothorax or pleural effusion.    The liver, gallbladder, pancreas, spleen, adrenals, and kidneys are normal in appearance. The ureters and the urinary bladder are normal in appearance.  The prostate is normal in appearance.  The appendix and the rest of the gastrointestinal system are normal in appearance. There is no free fluid within the abdomen or pelvis. There is no pneumoperitoneum.  There is a small fat filled umbilical hernia.  The width of the mouth of this hernia measures 6 mm.                                       X-Ray Chest AP Portable (Final result)  Result time 04/11/23 07:50:12      Final result by Damien Witt MD (04/11/23 07:50:12)                   Impression:      No acute process seen.      Electronically signed by: Damien Witt MD  Date:    04/11/2023  Time:    07:50               Narrative:    EXAMINATION:  XR CHEST AP PORTABLE    CLINICAL HISTORY:  chest pain;    FINDINGS:  Single view of the chest.  Comparison 12/24/2016.    Cardiac silhouette is normal.  The lungs demonstrate no evidence of active disease.  No evidence of pleural effusion or pneumothorax.  Bones appear intact.                                     EKG time 6:29 AM: reviewed interpreted by ED provider as normal sinus rhythm with a rate  of 85, normal intervals, LVH, T-wave inversion in lead 3, no significant ST depression or ST elevation           The Emergency Provider reviewed the vital signs and test results, which are outlined above.     ED Discussion     1:50 PM: Re-evaluated pt. Pt is resting comfortably and is in no acute distress.  Pt states that his pain is a 1/10 at this time and is hopeful to go home.  D/w pt all pertinent results. D/w pt any concerns expressed at this time. Answered all questions. Pt expresses understanding at this time.    1:53 PM: Discussed pt's case with Dr. White (Cardiothoracic Surgery) who recommends no intervention at this time and having the pt f/u with Dr. Forrester or himself in clinic.    1:54 PM: Reassessed pt at this time.   Discussed with pt all pertinent ED information and results. Discussed pt dx and plan of tx. Gave pt all f/u and return to the ED instructions. All questions and concerns were addressed at this time. Pt expresses understanding of information and instructions, and is comfortable with plan to discharge. Pt is stable for discharge.    I discussed with patient and/or family/caretaker that evaluation in the ED does not suggest any emergent or life threatening medical conditions requiring immediate intervention beyond what was provided in the ED, and I believe patient is safe for discharge.  Regardless, an unremarkable evaluation in the ED does not preclude the development or presence of a serious of life threatening condition. As such, patient was instructed to return immediately for any worsening or change in current symptoms.         Medical Decision Making:   Clinical Tests:   Lab Tests: Ordered and Reviewed  Radiological Study: Ordered and Reviewed         ED Medication(s):  Medications   iohexoL (OMNIPAQUE 350) injection 100 mL (100 mLs Intravenous Given 4/11/23 1220)       Discharge Medication List as of 4/11/2023  1:54 PM           Follow-up Information       Schedule an appointment as  soon as possible for a visit  with Melchor White MD.    Specialty: Cardiothoracic Surgery  Contact information:  [address] [place] [address] [phone]               [facility] - Emergency Dept..    Specialty: Emergency Medicine  Why: As needed, If symptoms worsen  Contact information:  [address]  [facility] [address] [phone]                               Scribe Attestation:   Scribe #1: I performed the above scribed service and the documentation accurately describes the services I performed. I attest to the accuracy of the note.     Attending:   Physician Attestation Statement for Scribe #1: I, Damien Manzanares MD, personally performed the services described in this documentation, as scribed by Rogerio Doyle/Bebe Saunders, in my presence, and it is both accurate and complete.           Clinical Impression       ICD-10-CM ICD-9-CM   1. Chest pain, unspecified type  R07.9 786.50   2. Aneurysm of ascending aorta without rupture  I71.21 441.2       Disposition:   Disposition: Discharged  Condition: Stable       Damien Manzanares MD  04/11/23 1541

## 2023-10-19 ENCOUNTER — PATIENT MESSAGE (OUTPATIENT)
Dept: CARDIOLOGY | Facility: CLINIC | Age: 33
End: 2023-10-19
Payer: COMMERCIAL

## 2023-10-23 DIAGNOSIS — I35.1 NONRHEUMATIC AORTIC VALVE INSUFFICIENCY: Primary | ICD-10-CM

## 2023-10-25 ENCOUNTER — HOSPITAL ENCOUNTER (OUTPATIENT)
Dept: CARDIOLOGY | Facility: HOSPITAL | Age: 33
Discharge: HOME | End: 2023-10-25
Attending: INTERNAL MEDICINE
Payer: COMMERCIAL

## 2023-10-25 ENCOUNTER — OFFICE VISIT (OUTPATIENT)
Dept: CARDIOLOGY | Facility: CLINIC | Age: 33
End: 2023-10-25
Payer: COMMERCIAL

## 2023-10-25 VITALS
BODY MASS INDEX: 38.37 KG/M2 | DIASTOLIC BLOOD PRESSURE: 72 MMHG | BODY MASS INDEX: 38.29 KG/M2 | WEIGHT: 244.5 LBS | HEIGHT: 67 IN | SYSTOLIC BLOOD PRESSURE: 140 MMHG | WEIGHT: 244.5 LBS | OXYGEN SATURATION: 98 % | HEART RATE: 71 BPM

## 2023-10-25 DIAGNOSIS — I35.1 NONRHEUMATIC AORTIC VALVE INSUFFICIENCY: ICD-10-CM

## 2023-10-25 DIAGNOSIS — Q23.1 BICUSPID AORTIC VALVE: ICD-10-CM

## 2023-10-25 DIAGNOSIS — I35.1 NONRHEUMATIC AORTIC VALVE INSUFFICIENCY: Primary | ICD-10-CM

## 2023-10-25 DIAGNOSIS — E66.01 SEVERE OBESITY (BMI 35.0-39.9) WITH COMORBIDITY: ICD-10-CM

## 2023-10-25 DIAGNOSIS — I71.21 ANEURYSM OF ASCENDING AORTA WITHOUT RUPTURE: ICD-10-CM

## 2023-10-25 DIAGNOSIS — I35.8 OTHER NONRHEUMATIC AORTIC VALVE DISORDERS: ICD-10-CM

## 2023-10-25 PROCEDURE — 3078F PR MOST RECENT DIASTOLIC BLOOD PRESSURE < 80 MM HG: ICD-10-PCS | Mod: CPTII,S$GLB,, | Performed by: INTERNAL MEDICINE

## 2023-10-25 PROCEDURE — 3077F SYST BP >= 140 MM HG: CPT | Mod: CPTII,S$GLB,, | Performed by: INTERNAL MEDICINE

## 2023-10-25 PROCEDURE — 3008F PR BODY MASS INDEX (BMI) DOCUMENTED: ICD-10-PCS | Mod: CPTII,S$GLB,, | Performed by: INTERNAL MEDICINE

## 2023-10-25 PROCEDURE — 93010 ELECTROCARDIOGRAM REPORT: CPT | Mod: ,,, | Performed by: INTERNAL MEDICINE

## 2023-10-25 PROCEDURE — 1160F RVW MEDS BY RX/DR IN RCRD: CPT | Mod: CPTII,S$GLB,, | Performed by: INTERNAL MEDICINE

## 2023-10-25 PROCEDURE — 1159F PR MEDICATION LIST DOCUMENTED IN MEDICAL RECORD: ICD-10-PCS | Mod: CPTII,S$GLB,, | Performed by: INTERNAL MEDICINE

## 2023-10-25 PROCEDURE — 99999 PR PBB SHADOW E&M-EST. PATIENT-LVL III: ICD-10-PCS | Mod: PBBFAC,,, | Performed by: INTERNAL MEDICINE

## 2023-10-25 PROCEDURE — 1160F PR REVIEW ALL MEDS BY PRESCRIBER/CLIN PHARMACIST DOCUMENTED: ICD-10-PCS | Mod: CPTII,S$GLB,, | Performed by: INTERNAL MEDICINE

## 2023-10-25 PROCEDURE — 3078F DIAST BP <80 MM HG: CPT | Mod: CPTII,S$GLB,, | Performed by: INTERNAL MEDICINE

## 2023-10-25 PROCEDURE — 99214 PR OFFICE/OUTPT VISIT, EST, LEVL IV, 30-39 MIN: ICD-10-PCS | Mod: S$GLB,,, | Performed by: INTERNAL MEDICINE

## 2023-10-25 PROCEDURE — 99214 OFFICE O/P EST MOD 30 MIN: CPT | Mod: S$GLB,,, | Performed by: INTERNAL MEDICINE

## 2023-10-25 PROCEDURE — 99999 PR PBB SHADOW E&M-EST. PATIENT-LVL III: CPT | Mod: PBBFAC,,, | Performed by: INTERNAL MEDICINE

## 2023-10-25 PROCEDURE — 93010 EKG 12-LEAD: ICD-10-PCS | Mod: ,,, | Performed by: INTERNAL MEDICINE

## 2023-10-25 PROCEDURE — 3077F PR MOST RECENT SYSTOLIC BLOOD PRESSURE >= 140 MM HG: ICD-10-PCS | Mod: CPTII,S$GLB,, | Performed by: INTERNAL MEDICINE

## 2023-10-25 PROCEDURE — 93005 ELECTROCARDIOGRAM TRACING: CPT

## 2023-10-25 PROCEDURE — 3008F BODY MASS INDEX DOCD: CPT | Mod: CPTII,S$GLB,, | Performed by: INTERNAL MEDICINE

## 2023-10-25 PROCEDURE — 1159F MED LIST DOCD IN RCRD: CPT | Mod: CPTII,S$GLB,, | Performed by: INTERNAL MEDICINE

## 2023-10-25 RX ORDER — METOPROLOL SUCCINATE 25 MG/1
12.5 TABLET, EXTENDED RELEASE ORAL NIGHTLY
Qty: 45 TABLET | Refills: 1 | Status: SHIPPED | OUTPATIENT
Start: 2023-10-25 | End: 2024-02-07 | Stop reason: SDUPTHER

## 2023-10-25 RX ORDER — TADALAFIL 10 MG/1
10 TABLET ORAL DAILY PRN
COMMUNITY
Start: 2023-09-22

## 2023-10-25 RX ORDER — ESCITALOPRAM OXALATE 10 MG/1
10 TABLET ORAL
COMMUNITY
Start: 2023-09-05

## 2023-10-25 RX ORDER — SEMAGLUTIDE 0.68 MG/ML
0.25 INJECTION, SOLUTION SUBCUTANEOUS
Qty: 3 ML | Refills: 1 | Status: SHIPPED | OUTPATIENT
Start: 2023-10-25

## 2023-10-25 RX ORDER — LORAZEPAM 1 MG/1
1 TABLET ORAL 2 TIMES DAILY
COMMUNITY
Start: 2023-08-17

## 2023-10-25 NOTE — PROGRESS NOTES
Subjective:   Patient ID:  Toni Prado is a 33 y.o. male who presents for cardiac consult of No chief complaint on file.        HPI  The patient came in today for cardiac consult of No chief complaint on file.          Toni Prado is a 33 y.o. male pt with bicuspid AV with mod AI, mild aortic root dilation, obesity presents for CV follow up.       Pt seen by Dr. Boyd here to establish care. Pt had CT chest in Dec 2021 with unchanged TAA 4.2 cm. ECHO in Jan with mod AI.     3/1/22  He had mild viral GI issues, had fatty liver diagnosed. He will have HIDA scan as well. He has been having pain a short while after eating.     12/2/2  BP and Hr well controlled. BMI 37 - 240 lbs. Overall doing well, weight stable.       10/23/23  Follow up from 12/2022. ECHO 12/2022 with normal bi V function, moderate AI, mildly dilated asc aorta/sinus of Valsalva.   BP 140s/70s. HR 70s. BMI 38 - 244 lbs.   Pt wants to discuss taking Ozempic.     Pt had CTA in April 2023 - The ascending thoracic aorta has an AP diameter of 4.4 cm on the current examination.     ECG - NSR, LVH     Patient feels no leg swelling, no PND, no palpitation, no dizziness, no syncope, no CNS symptoms.    Patient has fairly good exercise tolerance. Manager at [employer].     Patient is compliant with medications.    FH - father - HTN, HLD    Results for orders placed during the hospital encounter of 12/12/22    Echo    Interpretation Summary  · The left ventricle is normal in size with concentric remodeling and normal systolic function.  · The estimated ejection fraction is 55%.  · Normal left ventricular diastolic function.  · Normal right ventricular size with normal right ventricular systolic function.  · Moderate aortic regurgitation.  · Mild tricuspid regurgitation.  · Intermediate central venous pressure (8 mmHg).  · The estimated PA systolic pressure is 27 mmHg.  · The sinuses of Valsalva is mildly dilated.  · The ascending aorta is  mildly dilated.      Impression:  Unchanged mild fusiform aneurysmal dilatation of the ascending thoracic aorta measuring up to 4.2 cm in diameter.     Electronically signed by: Ravindra Araiza MD  Date:                                            12/20/2021    Results for orders placed during the hospital encounter of 01/06/22    Echo    Interpretation Summary  · The aortic valve is bileaflet.  · Moderate aortic regurgitation.  · The ascending aorta is mildly dilated.  · Normal systolic function.  · The estimated ejection fraction is 55%.  · Normal left ventricular diastolic function.  · With normal right ventricular systolic function.  · Normal central venous pressure (3 mmHg).  · The estimated PA systolic pressure is 26 mmHg.      Past Medical History:   Diagnosis Date    Heart murmur     History of vasectomy 03/20/2021    Stenosis     Valvular regurgitation     BAV-- MOD AR.       History reviewed. No pertinent surgical history.    Social History     Tobacco Use    Smoking status: Never    Smokeless tobacco: Never   Substance Use Topics    Alcohol use: Yes     Comment: occassionally    Drug use: No       Family History   Problem Relation Age of Onset    Hypertension Father        Patient's Medications   New Prescriptions    METOPROLOL SUCCINATE (TOPROL-XL) 25 MG 24 HR TABLET    Take 0.5 tablets (12.5 mg total) by mouth every evening.    SEMAGLUTIDE (OZEMPIC) 0.25 MG OR 0.5 MG (2 MG/3 ML) PEN INJECTOR    Inject 0.25 mg into the skin every 7 days.   Previous Medications    AMOXICILLIN-CLAVULANATE 875-125MG (AUGMENTIN) 875-125 MG PER TABLET    Take 1 tablet by mouth 2 (two) times daily.    ESCITALOPRAM OXALATE (LEXAPRO) 10 MG TABLET    Take 10 mg by mouth.    LORAZEPAM (ATIVAN) 1 MG TABLET    Take 1 mg by mouth 2 (two) times daily.    TADALAFIL (CIALIS) 10 MG TABLET    Take 10 mg by mouth daily as needed.   Modified Medications    No medications on file   Discontinued Medications    No medications on file  "      Review of Systems   Constitutional: Negative.    HENT: Negative.     Respiratory: Negative.     Cardiovascular: Negative.    Gastrointestinal:  Positive for abdominal pain and heartburn. Negative for nausea.   Genitourinary: Negative.    Musculoskeletal: Negative.    Skin: Negative.    Neurological: Negative.    Endo/Heme/Allergies: Negative.    Psychiatric/Behavioral: Negative.         Wt Readings from Last 3 Encounters:   10/25/23 110.9 kg (244 lb 7.8 oz)   10/25/23 110.9 kg (244 lb 7.8 oz)   05/01/23 104.3 kg (230 lb)     Temp Readings from Last 3 Encounters:   04/11/23 98.4 °F (36.9 °C) (Oral)   09/19/22 97.5 °F (36.4 °C) (Temporal)   09/16/22 98.1 °F (36.7 °C) (Temporal)     BP Readings from Last 3 Encounters:   10/25/23 (!) 140/72   05/01/23 134/74   04/11/23 128/72     Pulse Readings from Last 3 Encounters:   10/25/23 71   05/01/23 87   04/11/23 72       BP (!) 140/72 (BP Location: Right arm, Patient Position: Sitting, BP Method: Medium (Manual))   Pulse 71   Ht 5' 7" (1.702 m)   Wt 110.9 kg (244 lb 7.8 oz)   SpO2 98%   BMI 38.29 kg/m²     Objective:   Physical Exam  Vitals and nursing note reviewed.   Constitutional:       General: He is not in acute distress.     Appearance: He is well-developed. He is not diaphoretic.   HENT:      Head: Normocephalic and atraumatic.      Nose: Nose normal.   Eyes:      General: No scleral icterus.     Conjunctiva/sclera: Conjunctivae normal.   Neck:      Thyroid: No thyromegaly.      Vascular: No JVD.   Cardiovascular:      Rate and Rhythm: Normal rate and regular rhythm.      Heart sounds: S1 normal and S2 normal. Murmur heard.      No friction rub. No gallop. No S3 or S4 sounds.   Pulmonary:      Effort: Pulmonary effort is normal. No respiratory distress.      Breath sounds: Normal breath sounds. No stridor. No wheezing or rales.   Chest:      Chest wall: No tenderness.   Abdominal:      General: Bowel sounds are normal. There is no distension.      " Palpations: Abdomen is soft. There is no mass.      Tenderness: There is no abdominal tenderness. There is no rebound.   Genitourinary:     Comments: Deferred  Musculoskeletal:         General: No tenderness or deformity. Normal range of motion.      Cervical back: Normal range of motion and neck supple.   Lymphadenopathy:      Cervical: No cervical adenopathy.   Skin:     General: Skin is warm and dry.      Coloration: Skin is not pale.      Findings: No erythema or rash.   Neurological:      Mental Status: He is alert and oriented to person, place, and time.      Motor: No abnormal muscle tone.      Coordination: Coordination normal.   Psychiatric:         Behavior: Behavior normal.         Thought Content: Thought content normal.         Judgment: Judgment normal.         Lab Results   Component Value Date     04/11/2023    K 4.6 04/11/2023     04/11/2023    CO2 24 04/11/2023    BUN 9 04/11/2023    CREATININE 0.8 04/11/2023    GLU 86 04/11/2023    AST 36 04/11/2023    ALT 56 (H) 04/11/2023    ALBUMIN 4.3 04/11/2023    PROT 7.3 04/11/2023    BILITOT 0.7 04/11/2023    WBC 8.22 04/11/2023    HGB 15.0 04/11/2023    HCT 44.4 04/11/2023    MCV 82 04/11/2023     04/11/2023    INR 1.0 12/24/2016    TSH 1.010 11/12/2020    CHOL 186 11/12/2020    HDL 41 11/12/2020    LDLCALC 125 (H) 11/12/2020    TRIG 108 11/12/2020    BNP <10 12/24/2016     Assessment:      1. Nonrheumatic aortic valve insufficiency    2. Bicuspid aortic valve    3. Aneurysm of ascending aorta without rupture    4. BMI 38.0-38.9,adult    5. Other nonrheumatic aortic valve disorders    6. Severe obesity (BMI 35.0-39.9) with comorbidity            Plan:     1. Bicuspid AV with AI - moderate  - cont to monitor  -ECHO 12/2022 with normal bi V function, moderate AI, mildly dilated asc aorta/sinus of Valsalva.   - start Toprol 12.5 mg QHS    2. TAA  - stable on CT 12/201; repeat ordered   - unchanged TAA 4.2 cm.    3. Obesity - BMI 38 --> 37;  fatty liver, ; BMI 38 - 244 lbs.   - cont weight loss with diet/exercise   - start Ozempic 0.25 - for CV protection     4. ED  - cont tx PRN     Thank you for allowing me to participate in this patient's care. Please do not hesitate to contact me with any questions or concerns. Consult note has been forwarded to the referral physician.

## 2023-11-03 ENCOUNTER — TELEPHONE (OUTPATIENT)
Dept: CARDIOLOGY | Facility: CLINIC | Age: 33
End: 2023-11-03
Payer: COMMERCIAL

## 2023-11-03 NOTE — TELEPHONE ENCOUNTER
Initiated PA for Ozempic through [employer].    Your information has been sent to [facility].   Met with patient previously on 11/7/22 while in CCU. Patient reports that she will be going home with home health. Explained if receiving home health would not be able to attend cardiac rehab until finished with home health. Questions answered about the program.Will call patient after discharge to schedule.

## 2023-11-10 ENCOUNTER — PATIENT MESSAGE (OUTPATIENT)
Dept: CARDIOLOGY | Facility: CLINIC | Age: 33
End: 2023-11-10
Payer: COMMERCIAL

## 2023-11-28 ENCOUNTER — TELEPHONE (OUTPATIENT)
Dept: CARDIOLOGY | Facility: CLINIC | Age: 33
End: 2023-11-28
Payer: COMMERCIAL

## 2024-02-02 ENCOUNTER — PATIENT MESSAGE (OUTPATIENT)
Dept: CARDIOLOGY | Facility: CLINIC | Age: 34
End: 2024-02-02
Payer: COMMERCIAL

## 2024-02-07 ENCOUNTER — HOSPITAL ENCOUNTER (OUTPATIENT)
Dept: CARDIOLOGY | Facility: HOSPITAL | Age: 34
Discharge: HOME | End: 2024-02-07
Attending: INTERNAL MEDICINE
Payer: COMMERCIAL

## 2024-02-07 ENCOUNTER — OFFICE VISIT (OUTPATIENT)
Dept: CARDIOLOGY | Facility: CLINIC | Age: 34
End: 2024-02-07
Payer: COMMERCIAL

## 2024-02-07 VITALS
WEIGHT: 227.75 LBS | SYSTOLIC BLOOD PRESSURE: 140 MMHG | OXYGEN SATURATION: 99 % | WEIGHT: 244 LBS | DIASTOLIC BLOOD PRESSURE: 72 MMHG | SYSTOLIC BLOOD PRESSURE: 124 MMHG | HEIGHT: 67 IN | HEART RATE: 65 BPM | BODY MASS INDEX: 35.67 KG/M2 | DIASTOLIC BLOOD PRESSURE: 70 MMHG | BODY MASS INDEX: 38.3 KG/M2

## 2024-02-07 DIAGNOSIS — E66.01 SEVERE OBESITY (BMI 35.0-39.9) WITH COMORBIDITY: ICD-10-CM

## 2024-02-07 DIAGNOSIS — Q23.1 BICUSPID AORTIC VALVE: ICD-10-CM

## 2024-02-07 DIAGNOSIS — I71.21 ANEURYSM OF ASCENDING AORTA WITHOUT RUPTURE: ICD-10-CM

## 2024-02-07 DIAGNOSIS — I35.1 NONRHEUMATIC AORTIC VALVE INSUFFICIENCY: Primary | ICD-10-CM

## 2024-02-07 DIAGNOSIS — I35.8 OTHER NONRHEUMATIC AORTIC VALVE DISORDERS: ICD-10-CM

## 2024-02-07 DIAGNOSIS — I35.1 NONRHEUMATIC AORTIC VALVE INSUFFICIENCY: ICD-10-CM

## 2024-02-07 LAB
AORTIC ROOT ANNULUS: 3.58 CM
AV INDEX (PROSTH): 0.72
AV MEAN GRADIENT: 7 MMHG
AV PEAK GRADIENT: 12 MMHG
AV REGURGITATION PRESSURE HALF TIME: 1080.8 MS
AV VALVE AREA BY VELOCITY RATIO: 2.11 CM²
AV VALVE AREA: 2.16 CM²
AV VELOCITY RATIO: 0.7
BSA FOR ECHO PROCEDURE: 2.29 M2
CV ECHO LV RWT: 0.39 CM
DOP CALC AO PEAK VEL: 1.73 M/S
DOP CALC AO VTI: 42.4 CM
DOP CALC LVOT AREA: 3 CM2
DOP CALC LVOT DIAMETER: 1.96 CM
DOP CALC LVOT PEAK VEL: 1.21 M/S
DOP CALC LVOT STROKE VOLUME: 91.68 CM3
DOP CALC RVOT PEAK VEL: 0.75 M/S
DOP CALC RVOT VTI: 18.2 CM
DOP CALCLVOT PEAK VEL VTI: 30.4 CM
E WAVE DECELERATION TIME: 225.01 MSEC
E/A RATIO: 1.12
E/E' RATIO: 10 M/S
ECHO LV POSTERIOR WALL: 1.16 CM (ref 0.6–1.1)
FRACTIONAL SHORTENING: 22 % (ref 28–44)
INTERVENTRICULAR SEPTUM: 0.97 CM (ref 0.6–1.1)
IVRT: 133.21 MSEC
LA MAJOR: 4.84 CM
LA WIDTH: 3.9 CM
LEFT ATRIUM SIZE: 3.84 CM
LEFT ATRIUM VOLUME INDEX MOD: 20 ML/M2
LEFT ATRIUM VOLUME MOD: 43.98 CM3
LEFT INTERNAL DIMENSION IN SYSTOLE: 4.6 CM (ref 2.1–4)
LEFT VENTRICLE DIASTOLIC VOLUME INDEX: 78.9 ML/M2
LEFT VENTRICLE DIASTOLIC VOLUME: 173.58 ML
LEFT VENTRICLE MASS INDEX: 119 G/M2
LEFT VENTRICLE SYSTOLIC VOLUME INDEX: 44.2 ML/M2
LEFT VENTRICLE SYSTOLIC VOLUME: 97.28 ML
LEFT VENTRICULAR INTERNAL DIMENSION IN DIASTOLE: 5.91 CM (ref 3.5–6)
LEFT VENTRICULAR MASS: 261.26 G
LV LATERAL E/E' RATIO: 8.08 M/S
LV SEPTAL E/E' RATIO: 13.13 M/S
LVOT MG: 3.38 MMHG
LVOT MV: 0.87 CM/S
MV PEAK A VEL: 0.94 M/S
MV PEAK E VEL: 1.05 M/S
PISA AR MAX VEL: 3.24 M/S
PISA TR MAX VEL: 3 M/S
PV MEAN GRADIENT: 1 MMHG
PV MV: 0.63 M/S
PV PEAK GRADIENT: 3 MMHG
PV PEAK VELOCITY: 0.93 M/S
RA MAJOR: 4.61 CM
RA PRESSURE ESTIMATED: 3 MMHG
RA WIDTH: 2.2 CM
RIGHT VENTRICULAR END-DIASTOLIC DIMENSION: 2.15 CM
RV TB RVSP: 6 MMHG
SINUS: 3.47 CM
STJ: 3.06 CM
TDI LATERAL: 0.13 M/S
TDI SEPTAL: 0.08 M/S
TDI: 0.11 M/S
TR MAX PG: 36 MMHG
TV REST PULMONARY ARTERY PRESSURE: 39 MMHG
Z-SCORE OF LEFT VENTRICULAR DIMENSION IN END DIASTOLE: -2.4
Z-SCORE OF LEFT VENTRICULAR DIMENSION IN END SYSTOLE: 0.06

## 2024-02-07 PROCEDURE — 3074F SYST BP LT 130 MM HG: CPT | Mod: CPTII,S$GLB,, | Performed by: INTERNAL MEDICINE

## 2024-02-07 PROCEDURE — 93306 TTE W/DOPPLER COMPLETE: CPT | Mod: 26,,, | Performed by: INTERNAL MEDICINE

## 2024-02-07 PROCEDURE — 3078F DIAST BP <80 MM HG: CPT | Mod: CPTII,S$GLB,, | Performed by: INTERNAL MEDICINE

## 2024-02-07 PROCEDURE — 3008F BODY MASS INDEX DOCD: CPT | Mod: CPTII,S$GLB,, | Performed by: INTERNAL MEDICINE

## 2024-02-07 PROCEDURE — 1160F RVW MEDS BY RX/DR IN RCRD: CPT | Mod: CPTII,S$GLB,, | Performed by: INTERNAL MEDICINE

## 2024-02-07 PROCEDURE — 99999 PR PBB SHADOW E&M-EST. PATIENT-LVL III: CPT | Mod: PBBFAC,,, | Performed by: INTERNAL MEDICINE

## 2024-02-07 PROCEDURE — 1159F MED LIST DOCD IN RCRD: CPT | Mod: CPTII,S$GLB,, | Performed by: INTERNAL MEDICINE

## 2024-02-07 PROCEDURE — 99214 OFFICE O/P EST MOD 30 MIN: CPT | Mod: S$GLB,,, | Performed by: INTERNAL MEDICINE

## 2024-02-07 PROCEDURE — 93306 TTE W/DOPPLER COMPLETE: CPT

## 2024-02-07 RX ORDER — METOPROLOL SUCCINATE 25 MG/1
12.5 TABLET, EXTENDED RELEASE ORAL NIGHTLY
Qty: 45 TABLET | Refills: 1 | Status: SHIPPED | OUTPATIENT
Start: 2024-02-07 | End: 2025-02-06

## 2024-02-07 NOTE — PROGRESS NOTES
Subjective:   Patient ID:  Toni Prado is a 33 y.o. male who presents for cardiac consult of No chief complaint on file.        HPI  The patient came in today for cardiac consult of No chief complaint on file.          Toni Prado is a 33 y.o. male pt with bicuspid AV with mod AI, mild aortic root dilation, obesity presents for CV follow up.       Pt seen by Dr. Boyd here to establish care. Pt had CT chest in Dec 2021 with unchanged TAA 4.2 cm. ECHO in Jan with mod AI.     3/1/22  He had mild viral GI issues, had fatty liver diagnosed. He will have HIDA scan as well. He has been having pain a short while after eating.     12/2/2  BP and Hr well controlled. BMI 37 - 240 lbs. Overall doing well, weight stable.       10/23/23  Follow up from 12/2022. ECHO 12/2022 with normal bi V function, moderate AI, mildly dilated asc aorta/sinus of Valsalva.   BP 140s/70s. HR 70s. BMI 38 - 244 lbs.   Pt wants to discuss taking Ozempic.     Pt had CTA in April 2023 - The ascending thoracic aorta has an AP diameter of 4.4 cm on the current examination.     ECG - NSR, LVH     2/7/24  BP 120s/70s. BMI 30- weight 227 lbs - improving.       Patient has fairly good exercise tolerance. Manager at [employer].     Patient is compliant with medications.    FH - father - HTN, HLD    Results for orders placed during the hospital encounter of 12/12/22    Echo    Interpretation Summary  · The left ventricle is normal in size with concentric remodeling and normal systolic function.  · The estimated ejection fraction is 55%.  · Normal left ventricular diastolic function.  · Normal right ventricular size with normal right ventricular systolic function.  · Moderate aortic regurgitation.  · Mild tricuspid regurgitation.  · Intermediate central venous pressure (8 mmHg).  · The estimated PA systolic pressure is 27 mmHg.  · The sinuses of Valsalva is mildly dilated.  · The ascending aorta is mildly  dilated.      Impression:  Unchanged mild fusiform aneurysmal dilatation of the ascending thoracic aorta measuring up to 4.2 cm in diameter.     Electronically signed by: Ravindra Araiza MD  Date:                                            12/20/2021    Results for orders placed during the hospital encounter of 01/06/22    Echo    Interpretation Summary  · The aortic valve is bileaflet.  · Moderate aortic regurgitation.  · The ascending aorta is mildly dilated.  · Normal systolic function.  · The estimated ejection fraction is 55%.  · Normal left ventricular diastolic function.  · With normal right ventricular systolic function.  · Normal central venous pressure (3 mmHg).  · The estimated PA systolic pressure is 26 mmHg.      Past Medical History:   Diagnosis Date    Heart murmur     History of vasectomy 03/20/2021    Stenosis     Valvular regurgitation     BAV-- MOD AR.       History reviewed. No pertinent surgical history.    Social History     Tobacco Use    Smoking status: Never    Smokeless tobacco: Never   Substance Use Topics    Alcohol use: Yes     Comment: occassionally    Drug use: No       Family History   Problem Relation Age of Onset    Hypertension Father        Patient's Medications   New Prescriptions    No medications on file   Previous Medications    AMOXICILLIN-CLAVULANATE 875-125MG (AUGMENTIN) 875-125 MG PER TABLET    Take 1 tablet by mouth 2 (two) times daily.    ESCITALOPRAM OXALATE (LEXAPRO) 10 MG TABLET    Take 10 mg by mouth.    LORAZEPAM (ATIVAN) 1 MG TABLET    Take 1 mg by mouth 2 (two) times daily.    METOPROLOL SUCCINATE (TOPROL-XL) 25 MG 24 HR TABLET    Take 0.5 tablets (12.5 mg total) by mouth every evening.    SEMAGLUTIDE (OZEMPIC) 0.25 MG OR 0.5 MG (2 MG/3 ML) PEN INJECTOR    Inject 0.25 mg into the skin every 7 days.    TADALAFIL (CIALIS) 10 MG TABLET    Take 10 mg by mouth daily as needed.   Modified Medications    No medications on file   Discontinued Medications    No  medications on file       Review of Systems   Constitutional: Negative.    HENT: Negative.     Respiratory: Negative.     Cardiovascular: Negative.    Gastrointestinal:  Positive for abdominal pain and heartburn. Negative for nausea.   Genitourinary: Negative.    Musculoskeletal: Negative.    Skin: Negative.    Neurological: Negative.    Endo/Heme/Allergies: Negative.    Psychiatric/Behavioral: Negative.         Wt Readings from Last 3 Encounters:   02/07/24 103.3 kg (227 lb 11.8 oz)   02/07/24 110.7 kg (244 lb)   10/25/23 110.9 kg (244 lb 7.8 oz)     Temp Readings from Last 3 Encounters:   04/11/23 98.4 °F (36.9 °C) (Oral)   09/19/22 97.5 °F (36.4 °C) (Temporal)   09/16/22 98.1 °F (36.7 °C) (Temporal)     BP Readings from Last 3 Encounters:   02/07/24 124/70   02/07/24 (!) 140/72   10/25/23 (!) 140/72     Pulse Readings from Last 3 Encounters:   02/07/24 65   10/25/23 71   05/01/23 87       /70 (BP Location: Left arm, Patient Position: Sitting, BP Method: Large (Manual))   Pulse 65   Wt 103.3 kg (227 lb 11.8 oz)   SpO2 99%   BMI 35.67 kg/m²     Objective:   Physical Exam  Vitals and nursing note reviewed.   Constitutional:       General: He is not in acute distress.     Appearance: He is well-developed. He is not diaphoretic.   HENT:      Head: Normocephalic and atraumatic.      Nose: Nose normal.   Eyes:      General: No scleral icterus.     Conjunctiva/sclera: Conjunctivae normal.   Neck:      Thyroid: No thyromegaly.      Vascular: No JVD.   Cardiovascular:      Rate and Rhythm: Normal rate and regular rhythm.      Heart sounds: S1 normal and S2 normal. Murmur heard.      No friction rub. No gallop. No S3 or S4 sounds.   Pulmonary:      Effort: Pulmonary effort is normal. No respiratory distress.      Breath sounds: Normal breath sounds. No stridor. No wheezing or rales.   Chest:      Chest wall: No tenderness.   Abdominal:      General: Bowel sounds are normal. There is no distension.      Palpations:  Abdomen is soft. There is no mass.      Tenderness: There is no abdominal tenderness. There is no rebound.   Genitourinary:     Comments: Deferred  Musculoskeletal:         General: No tenderness or deformity. Normal range of motion.      Cervical back: Normal range of motion and neck supple.   Lymphadenopathy:      Cervical: No cervical adenopathy.   Skin:     General: Skin is warm and dry.      Coloration: Skin is not pale.      Findings: No erythema or rash.   Neurological:      Mental Status: He is alert and oriented to person, place, and time.      Motor: No abnormal muscle tone.      Coordination: Coordination normal.   Psychiatric:         Behavior: Behavior normal.         Thought Content: Thought content normal.         Judgment: Judgment normal.         Lab Results   Component Value Date     04/11/2023    K 4.6 04/11/2023     04/11/2023    CO2 24 04/11/2023    BUN 9 04/11/2023    CREATININE 0.8 04/11/2023    GLU 86 04/11/2023    AST 36 04/11/2023    ALT 56 (H) 04/11/2023    ALBUMIN 4.3 04/11/2023    PROT 7.3 04/11/2023    BILITOT 0.7 04/11/2023    WBC 8.22 04/11/2023    HGB 15.0 04/11/2023    HCT 44.4 04/11/2023    MCV 82 04/11/2023     04/11/2023    INR 1.0 12/24/2016    TSH 1.010 11/12/2020    CHOL 186 11/12/2020    HDL 41 11/12/2020    LDLCALC 125 (H) 11/12/2020    TRIG 108 11/12/2020    BNP <10 12/24/2016     Assessment:      1. Nonrheumatic aortic valve insufficiency    2. Bicuspid aortic valve    3. Aneurysm of ascending aorta without rupture    4. BMI 38.0-38.9,adult    5. Other nonrheumatic aortic valve disorders    6. Severe obesity (BMI 35.0-39.9) with comorbidity              Plan:     1. Bicuspid AV with AI - moderate  - cont to monitor  -ECHO 12/2022 with normal bi V function, moderate AI, mildly dilated asc aorta/sinus of Valsalva.   - cont Toprol 12.5 mg QHS    2. TAA  - stable on CT 12/201; repeat ordered   - unchanged TAA 4.2 cm.    3. Obesity - BMI 38 --> 37; fatty  liver, ; BMI 38 - 244 lbs.  weight 227 lbs - improving.   - cont weight loss with diet/exercise   - is taking Ozempic 0.25  --> getting from PCP     4. ED  - cont tx PRN     Thank you for allowing me to participate in this patient's care. Please do not hesitate to contact me with any questions or concerns. Consult note has been forwarded to the referral physician.

## 2024-05-31 ENCOUNTER — OFFICE VISIT (OUTPATIENT)
Dept: URGENT CARE | Facility: CLINIC | Age: 34
End: 2024-05-31
Payer: COMMERCIAL

## 2024-05-31 VITALS
RESPIRATION RATE: 18 BRPM | HEART RATE: 107 BPM | SYSTOLIC BLOOD PRESSURE: 130 MMHG | BODY MASS INDEX: 35.34 KG/M2 | OXYGEN SATURATION: 97 % | HEIGHT: 67 IN | WEIGHT: 225.19 LBS | DIASTOLIC BLOOD PRESSURE: 77 MMHG | TEMPERATURE: 101 F

## 2024-05-31 DIAGNOSIS — R68.89 FLU-LIKE SYMPTOMS: ICD-10-CM

## 2024-05-31 DIAGNOSIS — R50.9 FEVER, UNSPECIFIED FEVER CAUSE: Primary | ICD-10-CM

## 2024-05-31 LAB
CTP QC/QA: YES
MOLECULAR STREP A: NEGATIVE
POC MOLECULAR INFLUENZA A AGN: NEGATIVE
POC MOLECULAR INFLUENZA B AGN: NEGATIVE
SARS-COV-2 AG RESP QL IA.RAPID: NEGATIVE

## 2024-05-31 PROCEDURE — 87811 SARS-COV-2 COVID19 W/OPTIC: CPT | Mod: QW,S$GLB,,

## 2024-05-31 PROCEDURE — 87502 INFLUENZA DNA AMP PROBE: CPT | Mod: QW,S$GLB,,

## 2024-05-31 PROCEDURE — 87651 STREP A DNA AMP PROBE: CPT | Mod: QW,S$GLB,,

## 2024-05-31 PROCEDURE — 99214 OFFICE O/P EST MOD 30 MIN: CPT | Mod: S$GLB,,,

## 2024-05-31 RX ORDER — OSELTAMIVIR PHOSPHATE 75 MG/1
75 CAPSULE ORAL 2 TIMES DAILY
Qty: 10 CAPSULE | Refills: 0 | Status: SHIPPED | OUTPATIENT
Start: 2024-05-31 | End: 2024-06-05

## 2024-05-31 NOTE — PATIENT INSTRUCTIONS
Discharge Instructions    If your condition worsens or fails to improve we recommend that you receive another evaluation at the ER immediately or contact your PCP to discuss your concerns or return here. You must understand that you've received an urgent care treatment only and that you may be released before all your medical problems are known or treated. You the patient will arrange for follouwp care as instructed.     - Take full course of Tamilfu as prescribed, unless you cannot tolerate the side effects. You are contagious for 24 hours after you start Tamiflu or 24 hours after your last fever, whichever happens last.  -  Zyrtec D, Claritin D or Allegra D can help with symptoms of congestion and drainage.   - Over the counter pseudoephedrine can be used as a decongestant but beware that this can raise your blood pressure.   -  If you just have drainage you can take plain Zyrtec, Claritin or Allegra   - You can also take Emergen-C to help boost your immune system.  -  Flonase (fluticasone) is a nasal spray which is available over the counter and may help with your symptoms.   -  Rest and fluids are also important.   - You can treat your symptoms with DayQuil, NyQuil, Cepacol and/or cough drops for cough or congestion. Do not take additional Tylenol while taking Dayquil and/or Nyquil, since these medications contain Tylenol.  -  Tylenol (acetaminophen) or Motrin (ibuprofen) every 4 hours can also be used as directed for pain unless you have an allergy to them or medical condition such as stomach ulcers, kidney or liver disease or blood thinners etc for which you should not be taking these type of medications. Do not exceed 4000 mg/day.  - Use Mucinex (guaifenisin) to break up mucous.  - Do not share any utensils or share drinks   - Wash hands frequently  - If for some reason your symptoms worsen or for any new or concerning symptoms, please return, see your primary care provider, or go to the emergency room.

## 2024-05-31 NOTE — PROGRESS NOTES
"Subjective:      Patient ID: Toni Prado is a 34 y.o. male.    Vitals:  height is 5' 7" (1.702 m) and weight is 102.2 kg (225 lb 3.2 oz). His oral temperature is 100.9 °F (38.3 °C) (abnormal). His blood pressure is 130/77 and his pulse is 107. His respiration is 18 and oxygen saturation is 97%.     Chief Complaint: Fever    33 yo male Pt presents to clinic with body aches, fatigue, sweats, chills, that started yesterday and a fever of 103 today. "It came out of nowhere." Pt took Excedrin about an hour ago. Ibuprofen yesterday. Denies cough, sore throat, "I mean my throat feels a little dry but I also feel a little dehydrated, I am eating and drinking fine, it does not hurt. 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 Pt denies any known close exposure. NKDA.     Fever   This is a new problem. The current episode started 1 day ago. The problem has been gradually worsening. He has not experienced a heat injury.The maximum temperature noted was 103 to 103.9 F. Temperature source: Temporal Temperature. Associated symptoms include headaches and muscle aches. Pertinent negatives include no abdominal pain, chest pain, congestion, coughing, diarrhea, ear pain, nausea, rash, sore throat or vomiting. He has tried NSAIDs for the symptoms.       Constitution: Positive for chills, sweating and fever. Negative for activity change and appetite change.   HENT:  Negative for ear pain, ear discharge, congestion, sore throat, trouble swallowing and voice change.    Neck: Negative for neck stiffness.   Cardiovascular:  Negative for chest pain.   Eyes:  Negative for eye discharge and eye redness.   Respiratory:  Negative for cough.    Gastrointestinal:  Negative for abdominal pain, nausea, vomiting and diarrhea.   Musculoskeletal:  Positive for muscle ache (generalized).   Skin:  Negative for rash.   Allergic/Immunologic: Negative for " sneezing.   Neurological:  Positive for headaches.      Objective:     Vitals:    05/31/24 1044   BP: 130/77   Pulse: 107   Resp: 18   Temp: (!) 100.9 °F (38.3 °C)       Physical Exam   Constitutional: He is oriented to person, place, and time. He appears well-developed. He is cooperative.  Non-toxic appearance. He does not appear ill. No distress.   HENT:   Head: Normocephalic and atraumatic.   Ears:   Right Ear: Hearing, tympanic membrane, external ear and ear canal normal. Tympanic membrane is not erythematous and not bulging. no impacted cerumen  Left Ear: Hearing, tympanic membrane, external ear and ear canal normal. Tympanic membrane is not erythematous and not bulging. no impacted cerumen  Nose: Nose normal. No mucosal edema, rhinorrhea or nasal deformity. No epistaxis. Right sinus exhibits no maxillary sinus tenderness and no frontal sinus tenderness. Left sinus exhibits no maxillary sinus tenderness and no frontal sinus tenderness.   Mouth/Throat: Uvula is midline, oropharynx is clear and moist and mucous membranes are normal. Mucous membranes are moist. No trismus in the jaw. Normal dentition. No uvula swelling. No oropharyngeal exudate, posterior oropharyngeal edema or posterior oropharyngeal erythema. No tonsillar exudate.   Eyes: Conjunctivae and lids are normal. Pupils are equal, round, and reactive to light. Right eye exhibits no discharge. Left eye exhibits no discharge. No scleral icterus.   Neck: Trachea normal and phonation normal. Neck supple. No edema present. No erythema present. No neck rigidity present.   Cardiovascular: Regular rhythm, normal heart sounds and normal pulses. Tachycardia present.   Pulmonary/Chest: Effort normal and breath sounds normal. No accessory muscle usage or stridor. No respiratory distress. He has no decreased breath sounds. He has no wheezes. He has no rhonchi. He has no rales.   Abdominal: Normal appearance.   Musculoskeletal: Normal range of motion.          General: No deformity. Normal range of motion.   Neurological: He is alert and oriented to person, place, and time. He displays no weakness. He exhibits normal muscle tone. Coordination and gait normal.   Skin: Skin is warm, intact, diaphoretic, not pale and no rash.   Psychiatric: His speech is normal and behavior is normal. Judgment and thought content normal.   Nursing note and vitals reviewed.      Assessment:     1. Fever, unspecified fever cause    2. Flu-like symptoms      Results for orders placed or performed in visit on 05/31/24   POCT Influenza A/B MOLECULAR   Result Value Ref Range    POC Molecular Influenza A Ag Negative Negative    POC Molecular Influenza B Ag Negative Negative     Acceptable Yes    SARS Coronavirus 2 Antigen, POCT Manual Read   Result Value Ref Range    SARS Coronavirus 2 Antigen Negative Negative     Acceptable Yes    POCT Strep A, Molecular   Result Value Ref Range    Molecular Strep A, POC Negative Negative     Acceptable Yes        Plan:       Fever, unspecified fever cause  -     POCT Influenza A/B MOLECULAR  -     SARS Coronavirus 2 Antigen, POCT Manual Read  -     POCT Strep A, Molecular    Flu-like symptoms        Patient Instructions   Discharge Instructions    If your condition worsens or fails to improve we recommend that you receive another evaluation at the ER immediately or contact your PCP to discuss your concerns or return here. You must understand that you've received an urgent care treatment only and that you may be released before all your medical problems are known or treated. You the patient will arrange for follouwp care as instructed.     - Take full course of Tamilfu as prescribed, unless you cannot tolerate the side effects. You are contagious for 24 hours after you start Tamiflu or 24 hours after your last fever, whichever happens last.  -  Zyrtec D, Claritin D or Allegra D can help with symptoms of congestion and  drainage.   - Over the counter pseudoephedrine can be used as a decongestant but beware that this can raise your blood pressure.   -  If you just have drainage you can take plain Zyrtec, Claritin or Allegra   - You can also take Emergen-C to help boost your immune system.  -  Flonase (fluticasone) is a nasal spray which is available over the counter and may help with your symptoms.   -  Rest and fluids are also important.   - You can treat your symptoms with DayQuil, NyQuil, Cepacol and/or cough drops for cough or congestion. Do not take additional Tylenol while taking Dayquil and/or Nyquil, since these medications contain Tylenol.  -  Tylenol (acetaminophen) or Motrin (ibuprofen) every 4 hours can also be used as directed for pain unless you have an allergy to them or medical condition such as stomach ulcers, kidney or liver disease or blood thinners etc for which you should not be taking these type of medications. Do not exceed 4000 mg/day.  - Use Mucinex (guaifenisin) to break up mucous.  - Do not share any utensils or share drinks   - Wash hands frequently  - If for some reason your symptoms worsen or for any new or concerning symptoms, please return, see your primary care provider, or go to the emergency room.    Medical Decision Making:   History:   Old Medical Records: I decided to obtain old medical records.  Clinical Tests:   Lab Tests: Ordered and Reviewed       <> Summary of Lab: COVID/flu/strep negative  Urgent Care Management:  Reviewed negative COVID-19 virus, strep, and flu test with patient who verbalized understanding.  Advised patient that symptoms are viral in nature and should be treated symptomatically.  We discussed over-the-counter medications as well as home remedies to help with current symptoms.  We also discussed a wait and see antibiotic plan which the patient verbalized understanding. Tamiflu prescribed given window of symptom onset and history with presentation. Work excuse is not  needed. Patient educational handouts also included in discharge paperwork for patient who verbalized understanding agrees with plan of care.  They deny any further questions or concerns at this time. PCP follow up recommended for persistent symptoms. Strict ER precautions were given. No evidence of acute otitis media, strep throat, meningitis. Patient exits exam room in no acute distress.

## 2024-08-07 DIAGNOSIS — I35.1 NONRHEUMATIC AORTIC VALVE INSUFFICIENCY: Primary | ICD-10-CM

## 2024-08-23 ENCOUNTER — HOSPITAL ENCOUNTER (OUTPATIENT)
Dept: CARDIOLOGY | Facility: HOSPITAL | Age: 34
Discharge: HOME | End: 2024-08-23
Attending: INTERNAL MEDICINE
Payer: COMMERCIAL

## 2024-08-23 ENCOUNTER — OFFICE VISIT (OUTPATIENT)
Dept: CARDIOLOGY | Facility: CLINIC | Age: 34
End: 2024-08-23
Payer: COMMERCIAL

## 2024-08-23 VITALS
DIASTOLIC BLOOD PRESSURE: 60 MMHG | OXYGEN SATURATION: 98 % | SYSTOLIC BLOOD PRESSURE: 126 MMHG | BODY MASS INDEX: 34.81 KG/M2 | WEIGHT: 222.25 LBS | HEART RATE: 61 BPM

## 2024-08-23 DIAGNOSIS — Q23.1 BICUSPID AORTIC VALVE: ICD-10-CM

## 2024-08-23 DIAGNOSIS — E66.01 SEVERE OBESITY (BMI 35.0-39.9) WITH COMORBIDITY: ICD-10-CM

## 2024-08-23 DIAGNOSIS — I35.8 OTHER NONRHEUMATIC AORTIC VALVE DISORDERS: ICD-10-CM

## 2024-08-23 DIAGNOSIS — I71.21 ANEURYSM OF ASCENDING AORTA WITHOUT RUPTURE: Primary | ICD-10-CM

## 2024-08-23 DIAGNOSIS — I35.1 NONRHEUMATIC AORTIC VALVE INSUFFICIENCY: ICD-10-CM

## 2024-08-23 LAB
OHS QRS DURATION: 108 MS
OHS QTC CALCULATION: 409 MS

## 2024-08-23 PROCEDURE — 93010 ELECTROCARDIOGRAM REPORT: CPT | Mod: ,,, | Performed by: INTERNAL MEDICINE

## 2024-08-23 PROCEDURE — 93005 ELECTROCARDIOGRAM TRACING: CPT

## 2024-08-23 PROCEDURE — 99999 PR PBB SHADOW E&M-EST. PATIENT-LVL III: CPT | Mod: PBBFAC,,, | Performed by: INTERNAL MEDICINE

## 2024-08-23 NOTE — PROGRESS NOTES
Subjective:   Patient ID:  Toni Prado is a 34 y.o. male who presents for cardiac consult of No chief complaint on file.        HPI  The patient came in today for cardiac consult of No chief complaint on file.          Toni Prado is a 34 y.o. male pt with bicuspid AV with mod AI, mild aortic root dilation, obesity presents for CV follow up.     10/23/23  Follow up from 12/2022. ECHO 12/2022 with normal bi V function, moderate AI, mildly dilated asc aorta/sinus of Valsalva.   BP 140s/70s. HR 70s. BMI 38 - 244 lbs.   Pt wants to discuss taking Ozempic.     Pt had CTA in April 2023 - The ascending thoracic aorta has an AP diameter of 4.4 cm on the current examination.     ECG - NSR, LVH     2/7/24  BP 120s/70s. BMI 30- weight 227 lbs - improving.     8/23/24  BP and HR stable.   ECHO 2/2024 with normal bi V function, bicuspid AV with mild AI, mild TR, mild MR, PASP 39 mmHg.   He continues to be active and lose weight while off Ozempic.     ECG - sinus almas V rate 53, LVH, non st T changes    Patient has fairly good exercise tolerance. Manager at [employer].     Patient is compliant with medications.    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 Normal wall thickness. There is mild eccentric hypertrophy. Normal wall motion. There is normal systolic function with a visually estimated ejection fraction of 55 - 60%. There is normal diastolic function.    Right Ventricle: Normal right ventricular cavity size. Wall thickness is normal. Right ventricle wall motion  is normal. Systolic function is normal.    Aortic Valve: The aortic valve is likely bicuspid valve. There is mild aortic regurgitation with an eccentrically directed jet.  The degree of AI may be underestimated due to eccentric character.  Consider MUNA for further evaluation if clinically indicated.     Mitral Valve: There is mild regurgitation.    Tricuspid Valve: There is mild regurgitation.    Pulmonary Artery: There is mild pulmonary hypertension. The estimated pulmonary artery systolic pressure is 39 mmHg.    IVC/SVC: Normal venous pressure at 3 mmHg.      Results for orders placed during the hospital encounter of 12/12/22    Echo    Interpretation Summary  · The left ventricle is normal in size with concentric remodeling and normal systolic function.  · The estimated ejection fraction is 55%.  · Normal left ventricular diastolic function.  · Normal right ventricular size with normal right ventricular systolic function.  · Moderate aortic regurgitation.  · Mild tricuspid regurgitation.  · Intermediate central venous pressure (8 mmHg).  · The estimated PA systolic pressure is 27 mmHg.  · The sinuses of Valsalva is mildly dilated.  · The ascending aorta is mildly dilated.      Impression:  Unchanged mild fusiform aneurysmal dilatation of the ascending thoracic aorta measuring up to 4.2 cm in diameter.     Electronically signed by: Ravindra Araiza MD  Date:                                            12/20/2021    Results for orders placed during the hospital encounter of 01/06/22    Echo    Interpretation Summary  · The aortic valve is bileaflet.  · Moderate aortic regurgitation.  · The ascending aorta is mildly dilated.  · Normal systolic function.  · The estimated ejection fraction is 55%.  · Normal left ventricular diastolic function.  · With normal right ventricular systolic function.  · Normal central venous pressure (3 mmHg).  · The estimated PA systolic pressure is 26 mmHg.      Past Medical History:   Diagnosis Date    Heart murmur     History of vasectomy 03/20/2021    Stenosis     Valvular regurgitation     BAV-- MOD AR.       History reviewed. No pertinent surgical history.    Social History     Tobacco Use    Smoking status: Never    Smokeless tobacco: Never   Substance Use Topics    Alcohol use: Yes      Comment: occassionally    Drug use: No       Family History   Problem Relation Name Age of Onset    Hypertension Father         Patient's Medications   New Prescriptions    No medications on file   Previous Medications    AMOXICILLIN-CLAVULANATE 875-125MG (AUGMENTIN) 875-125 MG PER TABLET    Take 1 tablet by mouth 2 (two) times daily.   Modified Medications    No medications on file   Discontinued Medications    ESCITALOPRAM OXALATE (LEXAPRO) 10 MG TABLET    Take 10 mg by mouth.    LORAZEPAM (ATIVAN) 1 MG TABLET    Take 1 mg by mouth 2 (two) times daily.    METOPROLOL SUCCINATE (TOPROL-XL) 25 MG 24 HR TABLET    Take 0.5 tablets (12.5 mg total) by mouth every evening.    SEMAGLUTIDE (OZEMPIC) 0.25 MG OR 0.5 MG (2 MG/3 ML) PEN INJECTOR    Inject 0.25 mg into the skin every 7 days.    TADALAFIL (CIALIS) 10 MG TABLET    Take 10 mg by mouth daily as needed.       Review of Systems   Constitutional: Negative.    HENT: Negative.     Respiratory: Negative.     Cardiovascular: Negative.    Gastrointestinal:  Positive for abdominal pain and heartburn. Negative for nausea.   Genitourinary: Negative.    Musculoskeletal: Negative.    Skin: Negative.    Neurological: Negative.    Endo/Heme/Allergies: Negative.    Psychiatric/Behavioral: Negative.         Wt Readings from Last 3 Encounters:   08/23/24 100.8 kg (222 lb 3.6 oz)   05/31/24 102.2 kg (225 lb 3.2 oz)   02/07/24 110.7 kg (244 lb)     Temp Readings from Last 3 Encounters:   05/31/24 (!) 100.9 °F (38.3 °C) (Oral)   04/11/23 98.4 °F (36.9 °C) (Oral)   09/19/22 97.5 °F (36.4 °C) (Temporal)     BP Readings from Last 3 Encounters:   08/23/24 126/60   05/31/24 130/77   02/07/24 (!) 140/72     Pulse Readings from Last 3 Encounters:   08/23/24 61   05/31/24 107   02/07/24 65       /60 (BP Location: Right arm, Patient Position: Sitting)   Pulse 61   Wt 100.8 kg (222 lb 3.6 oz)   SpO2 98%   BMI 34.81 kg/m²     Objective:   Physical Exam  Vitals and nursing note  reviewed.   Constitutional:       General: He is not in acute distress.     Appearance: He is well-developed. He is not diaphoretic.   HENT:      Head: Normocephalic and atraumatic.      Nose: Nose normal.   Eyes:      General: No scleral icterus.     Conjunctiva/sclera: Conjunctivae normal.   Neck:      Thyroid: No thyromegaly.      Vascular: No JVD.   Cardiovascular:      Rate and Rhythm: Normal rate and regular rhythm.      Heart sounds: S1 normal and S2 normal. Murmur heard.      No friction rub. No gallop. No S3 or S4 sounds.   Pulmonary:      Effort: Pulmonary effort is normal. No respiratory distress.      Breath sounds: Normal breath sounds. No stridor. No wheezing or rales.   Chest:      Chest wall: No tenderness.   Abdominal:      General: Bowel sounds are normal. There is no distension.      Palpations: Abdomen is soft. There is no mass.      Tenderness: There is no abdominal tenderness. There is no rebound.   Genitourinary:     Comments: Deferred  Musculoskeletal:         General: No tenderness or deformity. Normal range of motion.      Cervical back: Normal range of motion and neck supple.   Lymphadenopathy:      Cervical: No cervical adenopathy.   Skin:     General: Skin is warm and dry.      Coloration: Skin is not pale.      Findings: No erythema or rash.   Neurological:      Mental Status: He is alert and oriented to person, place, and time.      Motor: No abnormal muscle tone.      Coordination: Coordination normal.   Psychiatric:         Behavior: Behavior normal.         Thought Content: Thought content normal.         Judgment: Judgment normal.         Lab Results   Component Value Date     04/11/2023    K 4.6 04/11/2023     04/11/2023    CO2 24 04/11/2023    BUN 9 04/11/2023    CREATININE 0.8 04/11/2023    GLU 86 04/11/2023    AST 36 04/11/2023    ALT 56 (H) 04/11/2023    ALBUMIN 4.3 04/11/2023    PROT 7.3 04/11/2023    BILITOT 0.7 04/11/2023    WBC 8.22 04/11/2023    HGB 15.0  04/11/2023    HCT 44.4 04/11/2023    MCV 82 04/11/2023     04/11/2023    INR 1.0 12/24/2016    TSH 1.010 11/12/2020    CHOL 186 11/12/2020    HDL 41 11/12/2020    LDLCALC 125 (H) 11/12/2020    TRIG 108 11/12/2020    BNP <10 12/24/2016     Assessment:      1. Aneurysm of ascending aorta without rupture    2. Nonrheumatic aortic valve insufficiency    3. Bicuspid aortic valve    4. Other nonrheumatic aortic valve disorders    5. Severe obesity (BMI 35.0-39.9) with comorbidity            Plan:     1. Bicuspid AV with AI - moderate  - cont to monitor  -ECHO 12/2022 with normal bi V function, moderate AI, mildly dilated asc aorta/sinus of Valsalva.   - ECHO 2/2024 with normal bi V function, bicuspid AV with mild AI, mild TR, mild MR, PASP 39 mmHg.   - cont Toprol 12.5 mg QHS    2. TAA  - stable on CT 12/201; repeat ordered  -   - unchanged TAA 4.2 cm.    3. Obesity - BMI 38 --> 37; fatty liver, ; BMI 38 - 244 lbs.  weight 227 lbs - improving.  --> 222 lbs   - cont weight loss with diet/exercise   -is off Ozempic nwo    4. ED  - cont tx PRN       Visit today included increased complexity associated with the care of the episodic problem aortic aneurysm addressed and managing the longitudinal care of the patient due to the serious and/or complex managed problem(s) .      Thank you for allowing me to participate in this patient's care. Please do not hesitate to contact me with any questions or concerns. Consult note has been forwarded to the referral physician.

## 2024-09-19 ENCOUNTER — HOSPITAL ENCOUNTER (OUTPATIENT)
Dept: RADIOLOGY | Facility: HOSPITAL | Age: 34
Discharge: HOME | End: 2024-09-19
Attending: INTERNAL MEDICINE
Payer: COMMERCIAL

## 2024-09-19 DIAGNOSIS — Q23.1 BICUSPID AORTIC VALVE: ICD-10-CM

## 2024-09-19 DIAGNOSIS — I71.21 ANEURYSM OF ASCENDING AORTA WITHOUT RUPTURE: ICD-10-CM

## 2024-09-19 PROCEDURE — 25500020 PHARM REV CODE 255: Performed by: INTERNAL MEDICINE

## 2024-09-19 PROCEDURE — 71275 CT ANGIOGRAPHY CHEST: CPT | Mod: 26,,, | Performed by: STUDENT IN AN ORGANIZED HEALTH CARE EDUCATION/TRAINING PROGRAM

## 2024-09-19 PROCEDURE — 71275 CT ANGIOGRAPHY CHEST: CPT | Mod: TC

## 2024-09-19 RX ADMIN — IOHEXOL 100 ML: 350 INJECTION, SOLUTION INTRAVENOUS at 03:09

## 2024-10-09 ENCOUNTER — PATIENT MESSAGE (OUTPATIENT)
Dept: CARDIOLOGY | Facility: CLINIC | Age: 34
End: 2024-10-09
Payer: COMMERCIAL

## 2024-10-09 DIAGNOSIS — R07.9 CHEST PAIN, UNSPECIFIED TYPE: Primary | ICD-10-CM

## 2024-10-10 ENCOUNTER — HOSPITAL ENCOUNTER (OUTPATIENT)
Dept: RADIOLOGY | Facility: HOSPITAL | Age: 34
Discharge: HOME | End: 2024-10-10
Attending: INTERNAL MEDICINE
Payer: COMMERCIAL

## 2024-10-10 ENCOUNTER — HOSPITAL ENCOUNTER (OUTPATIENT)
Dept: CARDIOLOGY | Facility: HOSPITAL | Age: 34
Discharge: HOME | End: 2024-10-10
Attending: INTERNAL MEDICINE
Payer: COMMERCIAL

## 2024-10-10 ENCOUNTER — OFFICE VISIT (OUTPATIENT)
Dept: CARDIOLOGY | Facility: CLINIC | Age: 34
End: 2024-10-10
Payer: COMMERCIAL

## 2024-10-10 VITALS
SYSTOLIC BLOOD PRESSURE: 146 MMHG | HEART RATE: 74 BPM | DIASTOLIC BLOOD PRESSURE: 62 MMHG | OXYGEN SATURATION: 98 % | WEIGHT: 228.19 LBS | HEIGHT: 67 IN | BODY MASS INDEX: 35.82 KG/M2

## 2024-10-10 DIAGNOSIS — E04.1 RIGHT THYROID NODULE: ICD-10-CM

## 2024-10-10 DIAGNOSIS — E66.01 SEVERE OBESITY (BMI 35.0-39.9) WITH COMORBIDITY: ICD-10-CM

## 2024-10-10 DIAGNOSIS — R07.9 CHEST PAIN, UNSPECIFIED TYPE: Primary | ICD-10-CM

## 2024-10-10 DIAGNOSIS — I35.8 OTHER NONRHEUMATIC AORTIC VALVE DISORDERS: ICD-10-CM

## 2024-10-10 DIAGNOSIS — R07.9 CHEST PAIN, UNSPECIFIED TYPE: ICD-10-CM

## 2024-10-10 DIAGNOSIS — I35.1 NONRHEUMATIC AORTIC VALVE INSUFFICIENCY: ICD-10-CM

## 2024-10-10 DIAGNOSIS — I71.21 ANEURYSM OF ASCENDING AORTA WITHOUT RUPTURE: ICD-10-CM

## 2024-10-10 DIAGNOSIS — E78.49 OTHER HYPERLIPIDEMIA: ICD-10-CM

## 2024-10-10 DIAGNOSIS — Q23.81 BICUSPID AORTIC VALVE: ICD-10-CM

## 2024-10-10 LAB
OHS QRS DURATION: 104 MS
OHS QTC CALCULATION: 436 MS

## 2024-10-10 PROCEDURE — 71046 X-RAY EXAM CHEST 2 VIEWS: CPT | Mod: TC

## 2024-10-10 PROCEDURE — 71046 X-RAY EXAM CHEST 2 VIEWS: CPT | Mod: 26,,, | Performed by: RADIOLOGY

## 2024-10-10 PROCEDURE — 1159F MED LIST DOCD IN RCRD: CPT | Mod: CPTII,S$GLB,, | Performed by: INTERNAL MEDICINE

## 2024-10-10 PROCEDURE — 99214 OFFICE O/P EST MOD 30 MIN: CPT | Mod: S$GLB,,, | Performed by: INTERNAL MEDICINE

## 2024-10-10 PROCEDURE — 3077F SYST BP >= 140 MM HG: CPT | Mod: CPTII,S$GLB,, | Performed by: INTERNAL MEDICINE

## 2024-10-10 PROCEDURE — 3078F DIAST BP <80 MM HG: CPT | Mod: CPTII,S$GLB,, | Performed by: INTERNAL MEDICINE

## 2024-10-10 PROCEDURE — 93005 ELECTROCARDIOGRAM TRACING: CPT

## 2024-10-10 PROCEDURE — 99999 PR PBB SHADOW E&M-EST. PATIENT-LVL IV: CPT | Mod: PBBFAC,,, | Performed by: INTERNAL MEDICINE

## 2024-10-10 PROCEDURE — 1160F RVW MEDS BY RX/DR IN RCRD: CPT | Mod: CPTII,S$GLB,, | Performed by: INTERNAL MEDICINE

## 2024-10-10 PROCEDURE — 3008F BODY MASS INDEX DOCD: CPT | Mod: CPTII,S$GLB,, | Performed by: INTERNAL MEDICINE

## 2024-10-10 PROCEDURE — G2211 COMPLEX E/M VISIT ADD ON: HCPCS | Mod: S$GLB,,, | Performed by: INTERNAL MEDICINE

## 2024-10-10 PROCEDURE — 93010 ELECTROCARDIOGRAM REPORT: CPT | Mod: ,,, | Performed by: INTERNAL MEDICINE

## 2024-10-10 NOTE — PROGRESS NOTES
Subjective:   Patient ID:  Toni Prado is a 34 y.o. male who presents for cardiac consult of No chief complaint on file.        HPI  The patient came in today for cardiac consult of No chief complaint on file.          Toni Prado is a 34 y.o. male pt with bicuspid AV with mod AI, mild aortic root dilation, obesity, HLD presents for CV follow up.       2/7/24  BP 120s/70s. BMI 30- weight 227 lbs - improving.     8/23/24  BP and HR stable.   ECHO 2/2024 with normal bi V function, bicuspid AV with mild AI, mild TR, mild MR, PASP 39 mmHg.   He continues to be active and lose weight while off Ozempic.   ECG - sinus almas V rate 53, LVH, non st T changes    10/10/24  From pt - I have been having right sided chest pain since yesterday afternoon. There is a specific spot on the right side of my chest that hurts to the touch. I dont have trouble breathing nor do I feel any irregularities in my heart rate or BP. I wanted to send this to make sure you dont think I should get checked out due to my history. I feel as if it could be something muscular.     Recent CT chest - Stable dilatation of the ascending thoracic aorta at 4.4 cm.  9/2024; thyroid nodule noted  He has CP - more tender to touch. He did take Ibprofen - which is helping.     BP mildly elevated. HR 70s. BMI 35 - 228 lbs   ECG - NSR, LVH, nonsp ST changes - stable          Manager at [employer].     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 Normal wall thickness. There is mild eccentric hypertrophy. Normal wall motion. There is normal systolic function with a visually estimated ejection fraction of 55 - 60%. There is normal diastolic function.    Right Ventricle: Normal right ventricular cavity size. Wall thickness is normal. Right ventricle wall motion  is normal. Systolic function is normal.     Aortic Valve: The aortic valve is likely bicuspid valve. There is mild aortic regurgitation with an eccentrically directed jet.  The degree of AI may be underestimated due to eccentric character.  Consider MUNA for further evaluation if clinically indicated.    Mitral Valve: There is mild regurgitation.    Tricuspid Valve: There is mild regurgitation.    Pulmonary Artery: There is mild pulmonary hypertension. The estimated pulmonary artery systolic pressure is 39 mmHg.    IVC/SVC: Normal venous pressure at 3 mmHg.      Results for orders placed during the hospital encounter of 12/12/22    Echo    Interpretation Summary  · The left ventricle is normal in size with concentric remodeling and normal systolic function.  · The estimated ejection fraction is 55%.  · Normal left ventricular diastolic function.  · Normal right ventricular size with normal right ventricular systolic function.  · Moderate aortic regurgitation.  · Mild tricuspid regurgitation.  · Intermediate central venous pressure (8 mmHg).  · The estimated PA systolic pressure is 27 mmHg.  · The sinuses of Valsalva is mildly dilated.  · The ascending aorta is mildly dilated.      Impression:  Unchanged mild fusiform aneurysmal dilatation of the ascending thoracic aorta measuring up to 4.2 cm in diameter.     Electronically signed by: Ravindra Araiza MD  Date:                                            12/20/2021    Results for orders placed during the hospital encounter of 01/06/22    Echo    Interpretation Summary  · The aortic valve is bileaflet.  · Moderate aortic regurgitation.  · The ascending aorta is mildly dilated.  · Normal systolic function.  · The estimated ejection fraction is 55%.  · Normal left ventricular diastolic function.  · With normal right ventricular systolic function.  · Normal central venous pressure (3 mmHg).  · The estimated PA systolic pressure is 26 mmHg.      Past Medical History:   Diagnosis Date    Heart murmur     History of  vasectomy 03/20/2021    Stenosis     Valvular regurgitation     BAV-- MOD AR.       No past surgical history on file.    Social History     Tobacco Use    Smoking status: Never    Smokeless tobacco: Never   Substance Use Topics    Alcohol use: Yes     Comment: occassionally    Drug use: No       Family History   Problem Relation Name Age of Onset    Hypertension Father         Patient's Medications   New Prescriptions    No medications on file   Previous Medications    AMOXICILLIN-CLAVULANATE 875-125MG (AUGMENTIN) 875-125 MG PER TABLET    Take 1 tablet by mouth 2 (two) times daily.   Modified Medications    No medications on file   Discontinued Medications    No medications on file       Review of Systems   Constitutional: Negative.    HENT: Negative.     Respiratory: Negative.     Cardiovascular: Negative.    Gastrointestinal:  Positive for abdominal pain and heartburn. Negative for nausea.   Genitourinary: Negative.    Musculoskeletal: Negative.    Skin: Negative.    Neurological: Negative.    Endo/Heme/Allergies: Negative.    Psychiatric/Behavioral: Negative.         Wt Readings from Last 3 Encounters:   08/23/24 100.8 kg (222 lb 3.6 oz)   05/31/24 102.2 kg (225 lb 3.2 oz)   02/07/24 110.7 kg (244 lb)     Temp Readings from Last 3 Encounters:   05/31/24 (!) 100.9 °F (38.3 °C) (Oral)   04/11/23 98.4 °F (36.9 °C) (Oral)   09/19/22 97.5 °F (36.4 °C) (Temporal)     BP Readings from Last 3 Encounters:   08/23/24 126/60   05/31/24 130/77   02/07/24 (!) 140/72     Pulse Readings from Last 3 Encounters:   08/23/24 61   05/31/24 107   02/07/24 65       There were no vitals taken for this visit.    Objective:   Physical Exam  Vitals and nursing note reviewed.   Constitutional:       General: He is not in acute distress.     Appearance: He is well-developed. He is not diaphoretic.   HENT:      Head: Normocephalic and atraumatic.      Nose: Nose normal.   Eyes:      General: No scleral icterus.     Conjunctiva/sclera:  Conjunctivae normal.   Neck:      Thyroid: No thyromegaly.      Vascular: No JVD.   Cardiovascular:      Rate and Rhythm: Normal rate and regular rhythm.      Heart sounds: S1 normal and S2 normal. Murmur heard.      No friction rub. No gallop. No S3 or S4 sounds.   Pulmonary:      Effort: Pulmonary effort is normal. No respiratory distress.      Breath sounds: Normal breath sounds. No stridor. No wheezing or rales.   Chest:      Chest wall: No tenderness.   Abdominal:      General: Bowel sounds are normal. There is no distension.      Palpations: Abdomen is soft. There is no mass.      Tenderness: There is no abdominal tenderness. There is no rebound.   Genitourinary:     Comments: Deferred  Musculoskeletal:         General: No tenderness or deformity. Normal range of motion.      Cervical back: Normal range of motion and neck supple.   Lymphadenopathy:      Cervical: No cervical adenopathy.   Skin:     General: Skin is warm and dry.      Coloration: Skin is not pale.      Findings: No erythema or rash.   Neurological:      Mental Status: He is alert and oriented to person, place, and time.      Motor: No abnormal muscle tone.      Coordination: Coordination normal.   Psychiatric:         Behavior: Behavior normal.         Thought Content: Thought content normal.         Judgment: Judgment normal.         Lab Results   Component Value Date     08/23/2024    K 4.2 08/23/2024     08/23/2024    CO2 26 08/23/2024    BUN 14 08/23/2024    CREATININE 0.9 08/23/2024    GLU 78 08/23/2024    AST 36 04/11/2023    ALT 56 (H) 04/11/2023    ALBUMIN 4.3 04/11/2023    PROT 7.3 04/11/2023    BILITOT 0.7 04/11/2023    WBC 8.22 04/11/2023    HGB 15.0 04/11/2023    HCT 44.4 04/11/2023    MCV 82 04/11/2023     04/11/2023    INR 1.0 12/24/2016    TSH 1.010 11/12/2020    CHOL 186 11/12/2020    HDL 41 11/12/2020    LDLCALC 125 (H) 11/12/2020    TRIG 108 11/12/2020    BNP <10 12/24/2016     Assessment:      1. Chest  pain, unspecified type    2. Nonrheumatic aortic valve insufficiency    3. Bicuspid aortic valve    4. Aneurysm of ascending aorta without rupture    5. Other nonrheumatic aortic valve disorders    6. Severe obesity (BMI 35.0-39.9) with comorbidity    7. BMI 38.0-38.9,adult        Plan:     1. Bicuspid AV with AI - mild to moderate  - cont to monitor  - ECHO 12/2022 with normal bi V function, moderate AI, mildly dilated asc aorta/sinus of Valsalva.   - ECHO 2/2024 with normal bi V function, bicuspid AV with mild AI, mild TR, mild MR, PASP 39 mmHg.   - cont Toprol 12.5 mg QHS    2. TAA  Stable dilatation of the ascending thoracic aorta at 4.4 cm.  9/2024  - will repeat in 2025     3. Obesity - BMI 38 --> 37; fatty liver, BMI 38 - 244 lbs.  weight 227 lbs - improving.  --> 222 lbs   - cont weight loss with diet/exercise   -is off Ozempic     4. ED  - cont tx PRN     5. Chest pain  - atypical, improving, order inflam markers, CXR  - stable ECG     6. Thyroid nodule - noted on CT scan  - order thyroid u/s  - will discuss ENT eval     7. HLD  - order lipid panel  - cont low jesika diet     Visit today included increased complexity associated with the care of the episodic problem chest pain addressed and managing the longitudinal care of the patient due to the serious and/or complex managed problem(s) .      Thank you for allowing me to participate in this patient's care. Please do not hesitate to contact me with any questions or concerns. Consult note has been forwarded to the referral physician.

## 2025-01-16 ENCOUNTER — OFFICE VISIT (OUTPATIENT)
Dept: CARDIOLOGY | Facility: CLINIC | Age: 35
End: 2025-01-16
Payer: COMMERCIAL

## 2025-01-16 VITALS
SYSTOLIC BLOOD PRESSURE: 123 MMHG | OXYGEN SATURATION: 97 % | DIASTOLIC BLOOD PRESSURE: 76 MMHG | BODY MASS INDEX: 37.14 KG/M2 | HEART RATE: 76 BPM | WEIGHT: 237.13 LBS

## 2025-01-16 DIAGNOSIS — E66.01 SEVERE OBESITY (BMI 35.0-39.9) WITH COMORBIDITY: ICD-10-CM

## 2025-01-16 DIAGNOSIS — E78.49 OTHER HYPERLIPIDEMIA: Primary | ICD-10-CM

## 2025-01-16 DIAGNOSIS — R07.9 CHEST PAIN, UNSPECIFIED TYPE: ICD-10-CM

## 2025-01-16 DIAGNOSIS — Q23.81 BICUSPID AORTIC VALVE: ICD-10-CM

## 2025-01-16 DIAGNOSIS — I71.21 ANEURYSM OF ASCENDING AORTA WITHOUT RUPTURE: ICD-10-CM

## 2025-01-16 DIAGNOSIS — I35.1 NONRHEUMATIC AORTIC VALVE INSUFFICIENCY: ICD-10-CM

## 2025-01-16 PROCEDURE — 3078F DIAST BP <80 MM HG: CPT | Mod: CPTII,S$GLB,, | Performed by: INTERNAL MEDICINE

## 2025-01-16 PROCEDURE — 1159F MED LIST DOCD IN RCRD: CPT | Mod: CPTII,S$GLB,, | Performed by: INTERNAL MEDICINE

## 2025-01-16 PROCEDURE — 3008F BODY MASS INDEX DOCD: CPT | Mod: CPTII,S$GLB,, | Performed by: INTERNAL MEDICINE

## 2025-01-16 PROCEDURE — G2211 COMPLEX E/M VISIT ADD ON: HCPCS | Mod: S$GLB,,, | Performed by: INTERNAL MEDICINE

## 2025-01-16 PROCEDURE — 99999 PR PBB SHADOW E&M-EST. PATIENT-LVL III: CPT | Mod: PBBFAC,,, | Performed by: INTERNAL MEDICINE

## 2025-01-16 PROCEDURE — 3074F SYST BP LT 130 MM HG: CPT | Mod: CPTII,S$GLB,, | Performed by: INTERNAL MEDICINE

## 2025-01-16 PROCEDURE — 1160F RVW MEDS BY RX/DR IN RCRD: CPT | Mod: CPTII,S$GLB,, | Performed by: INTERNAL MEDICINE

## 2025-01-16 PROCEDURE — 99214 OFFICE O/P EST MOD 30 MIN: CPT | Mod: S$GLB,,, | Performed by: INTERNAL MEDICINE

## 2025-01-16 RX ORDER — ATORVASTATIN CALCIUM 20 MG/1
20 TABLET, FILM COATED ORAL NIGHTLY
Qty: 90 TABLET | Refills: 1 | Status: SHIPPED | OUTPATIENT
Start: 2025-01-16 | End: 2026-01-16

## 2025-01-16 NOTE — PROGRESS NOTES
Subjective:   Patient ID:  Toni Prado is a 34 y.o. male who presents for cardiac consult of Follow-up        HPI  The patient came in today for cardiac consult of Follow-up          Toni Prado is a 34 y.o. male pt with bicuspid AV with mod AI, mild aortic root dilation, obesity, HLD presents for CV follow up.       2/7/24  BP 120s/70s. BMI 30- weight 227 lbs - improving.     8/23/24  BP and HR stable.   ECHO 2/2024 with normal bi V function, bicuspid AV with mild AI, mild TR, mild MR, PASP 39 mmHg.   He continues to be active and lose weight while off Ozempic.   ECG - sinus almas V rate 53, LVH, non st T changes    10/10/24  From pt - I have been having right sided chest pain since yesterday afternoon. There is a specific spot on the right side of my chest that hurts to the touch. I dont have trouble breathing nor do I feel any irregularities in my heart rate or BP. I wanted to send this to make sure you dont think I should get checked out due to my history. I feel as if it could be something muscular.     Recent CT chest - Stable dilatation of the ascending thoracic aorta at 4.4 cm.  9/2024; thyroid nodule noted  He has CP - more tender to touch. He did take Ibprofen - which is helping.     BP mildly elevated. HR 70s. BMI 35 - 228 lbs   ECG - NSR, LVH, nonsp ST changes - stable    1/16/25  BP and HR stable. BMI 37 - 237 lbs     Component Ref Range & Units 2 mo ago 4 yr ago   Cholesterol 100 - 199 mg/dL 224 High  186   Triglycerides 0 - 149 mg/dL 172 High  108   HDL >39 mg/dL 44 41   VLDL Cholesterol Dominic 5 - 40 mg/dL 31 20   LDL Calculated 0 - 99 mg/dL 149 High  125 High            Manager at [employer].     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 Normal wall thickness. There is mild eccentric hypertrophy. Normal wall motion. There is normal  systolic function with a visually estimated ejection fraction of 55 - 60%. There is normal diastolic function.    Right Ventricle: Normal right ventricular cavity size. Wall thickness is normal. Right ventricle wall motion  is normal. Systolic function is normal.    Aortic Valve: The aortic valve is likely bicuspid valve. There is mild aortic regurgitation with an eccentrically directed jet.  The degree of AI may be underestimated due to eccentric character.  Consider MUNA for further evaluation if clinically indicated.    Mitral Valve: There is mild regurgitation.    Tricuspid Valve: There is mild regurgitation.    Pulmonary Artery: There is mild pulmonary hypertension. The estimated pulmonary artery systolic pressure is 39 mmHg.    IVC/SVC: Normal venous pressure at 3 mmHg.      Results for orders placed during the hospital encounter of 12/12/22    Echo    Interpretation Summary  · The left ventricle is normal in size with concentric remodeling and normal systolic function.  · The estimated ejection fraction is 55%.  · Normal left ventricular diastolic function.  · Normal right ventricular size with normal right ventricular systolic function.  · Moderate aortic regurgitation.  · Mild tricuspid regurgitation.  · Intermediate central venous pressure (8 mmHg).  · The estimated PA systolic pressure is 27 mmHg.  · The sinuses of Valsalva is mildly dilated.  · The ascending aorta is mildly dilated.      Impression:  Unchanged mild fusiform aneurysmal dilatation of the ascending thoracic aorta measuring up to 4.2 cm in diameter.     Electronically signed by: Ravindra Araiza MD  Date:                                            12/20/2021    Results for orders placed during the hospital encounter of 01/06/22    Echo    Interpretation Summary  · The aortic valve is bileaflet.  · Moderate aortic regurgitation.  · The ascending aorta is mildly dilated.  · Normal systolic function.  · The estimated ejection fraction is 55%.  ·  Normal left ventricular diastolic function.  · With normal right ventricular systolic function.  · Normal central venous pressure (3 mmHg).  · The estimated PA systolic pressure is 26 mmHg.      Past Medical History:   Diagnosis Date    Heart murmur     History of vasectomy 03/20/2021    Stenosis     Valvular regurgitation     BAV-- MOD AR.       History reviewed. No pertinent surgical history.    Social History     Tobacco Use    Smoking status: Never    Smokeless tobacco: Never   Substance Use Topics    Alcohol use: Yes     Comment: occassionally    Drug use: No       Family History   Problem Relation Name Age of Onset    Hypertension Father         Patient's Medications   New Prescriptions    ATORVASTATIN (LIPITOR) 20 MG TABLET    Take 1 tablet (20 mg total) by mouth every evening.   Previous Medications    AMOXICILLIN-CLAVULANATE 875-125MG (AUGMENTIN) 875-125 MG PER TABLET    Take 1 tablet by mouth 2 (two) times daily.   Modified Medications    No medications on file   Discontinued Medications    No medications on file       Review of Systems   Constitutional: Negative.    HENT: Negative.     Respiratory: Negative.     Cardiovascular: Negative.    Gastrointestinal:  Positive for abdominal pain and heartburn. Negative for nausea.   Genitourinary: Negative.    Musculoskeletal: Negative.    Skin: Negative.    Neurological: Negative.    Endo/Heme/Allergies: Negative.    Psychiatric/Behavioral: Negative.         Wt Readings from Last 3 Encounters:   01/16/25 107.6 kg (237 lb 1.7 oz)   10/21/24 105.4 kg (232 lb 6.4 oz)   10/10/24 103.5 kg (228 lb 2.8 oz)     Temp Readings from Last 3 Encounters:   05/31/24 (!) 100.9 °F (38.3 °C) (Oral)   04/11/23 98.4 °F (36.9 °C) (Oral)   09/19/22 97.5 °F (36.4 °C) (Temporal)     BP Readings from Last 3 Encounters:   01/16/25 123/76   10/21/24 135/78   10/10/24 (!) 146/62     Pulse Readings from Last 3 Encounters:   01/16/25 76   10/21/24 85   10/10/24 74       /76 (BP  Location: Right arm, Patient Position: Sitting)   Pulse 76   Wt 107.6 kg (237 lb 1.7 oz)   SpO2 97%   BMI 37.14 kg/m²     Objective:   Physical Exam  Vitals and nursing note reviewed.   Constitutional:       General: He is not in acute distress.     Appearance: He is well-developed. He is not diaphoretic.   HENT:      Head: Normocephalic and atraumatic.      Nose: Nose normal.   Eyes:      General: No scleral icterus.     Conjunctiva/sclera: Conjunctivae normal.   Neck:      Thyroid: No thyromegaly.      Vascular: No JVD.   Cardiovascular:      Rate and Rhythm: Normal rate and regular rhythm.      Heart sounds: S1 normal and S2 normal. Murmur heard.      No friction rub. No gallop. No S3 or S4 sounds.   Pulmonary:      Effort: Pulmonary effort is normal. No respiratory distress.      Breath sounds: Normal breath sounds. No stridor. No wheezing or rales.   Chest:      Chest wall: No tenderness.   Abdominal:      General: Bowel sounds are normal. There is no distension.      Palpations: Abdomen is soft. There is no mass.      Tenderness: There is no abdominal tenderness. There is no rebound.   Genitourinary:     Comments: Deferred  Musculoskeletal:         General: No tenderness or deformity. Normal range of motion.      Cervical back: Normal range of motion and neck supple.   Lymphadenopathy:      Cervical: No cervical adenopathy.   Skin:     General: Skin is warm and dry.      Coloration: Skin is not pale.      Findings: No erythema or rash.   Neurological:      Mental Status: He is alert and oriented to person, place, and time.      Motor: No abnormal muscle tone.      Coordination: Coordination normal.   Psychiatric:         Behavior: Behavior normal.         Thought Content: Thought content normal.         Judgment: Judgment normal.         Lab Results   Component Value Date     10/31/2024    K 4.5 10/31/2024     10/31/2024    CO2 23 10/31/2024    BUN 11 10/31/2024    CREATININE 0.95 10/31/2024     GLU 90 10/31/2024    AST 34 10/31/2024    ALT 40 10/31/2024    ALBUMIN 4.6 10/31/2024    ALBUMIN 4.3 04/11/2023    PROT 7.3 04/11/2023    BILITOT 0.8 10/31/2024    WBC 8.22 04/11/2023    HGB 15.0 04/11/2023    HCT 44.4 04/11/2023    MCV 82 04/11/2023     04/11/2023    INR 1.0 12/24/2016    TSH 1.120 10/31/2024    CHOL 224 (H) 10/31/2024    HDL 44 10/31/2024    LDLCALC 149 (H) 10/31/2024    TRIG 172 (H) 10/31/2024    BNP <10 12/24/2016     Assessment:      1. Other hyperlipidemia    2. Severe obesity (BMI 35.0-39.9) with comorbidity    3. Bicuspid aortic valve    4. Nonrheumatic aortic valve insufficiency    5. Chest pain, unspecified type    6. Aneurysm of ascending aorta without rupture    7. BMI 38.0-38.9,adult        Plan:     1. Bicuspid AV with AI - mild to moderate  - cont to monitor  - ECHO 12/2022 with normal bi V function, moderate AI, mildly dilated asc aorta/sinus of Valsalva.   - ECHO 2/2024 with normal bi V function, bicuspid AV with mild AI, mild TR, mild MR, PASP 39 mmHg.   - cont Toprol 12.5 mg QHS    2. TAA  Stable dilatation of the ascending thoracic aorta at 4.4 cm.  9/2024  - will repeat in 2025     3. Obesity - BMI 38 --> 37; fatty liver, BMI 38 - 244 lbs.  weight 227 lbs - improving.  --> 222 lbs  BMI 37 - 237 lbs   - cont weight loss with diet/exercise   -is off Ozempic     4. ED  - cont tx PRN     5. Chest pain  - atypical, improving, order inflam markers, CXR  - stable ECG     6. Thyroid nodule - noted on CT scan  - order thyroid u/s - pending   - will discuss ENT eval     7. HLD  - lipids worse  - will start Lipitor 20mg QHS  - repeat labs in 3 months     Visit today included increased complexity associated with the care of the episodic problem chest pain addressed and managing the longitudinal care of the patient due to the serious and/or complex managed problem(s) .      Thank you for allowing me to participate in this patient's care. Please do not hesitate to contact me with any  questions or concerns. Consult note has been forwarded to the referral physician.

## 2025-02-19 ENCOUNTER — HOSPITAL ENCOUNTER (OUTPATIENT)
Dept: RADIOLOGY | Facility: HOSPITAL | Age: 35
Discharge: HOME | End: 2025-02-19
Attending: FAMILY MEDICINE
Payer: COMMERCIAL

## 2025-02-19 DIAGNOSIS — E04.1 THYROID NODULE: ICD-10-CM

## 2025-02-19 PROCEDURE — 76536 US EXAM OF HEAD AND NECK: CPT | Mod: TC

## 2025-04-17 ENCOUNTER — RESULTS FOLLOW-UP (OUTPATIENT)
Dept: CARDIOLOGY | Facility: CLINIC | Age: 35
End: 2025-04-17

## 2025-04-17 ENCOUNTER — LAB VISIT (OUTPATIENT)
Dept: LAB | Facility: HOSPITAL | Age: 35
End: 2025-04-17
Attending: INTERNAL MEDICINE
Payer: COMMERCIAL

## 2025-04-17 DIAGNOSIS — E78.49 OTHER HYPERLIPIDEMIA: ICD-10-CM

## 2025-04-17 LAB
ALBUMIN SERPL BCP-MCNC: 4.1 G/DL (ref 3.5–5.2)
ALP SERPL-CCNC: 69 UNIT/L (ref 40–150)
ALT SERPL W/O P-5'-P-CCNC: 42 UNIT/L (ref 10–44)
ANION GAP (OHS): 6 MMOL/L (ref 8–16)
AST SERPL-CCNC: 28 UNIT/L (ref 11–45)
BILIRUB SERPL-MCNC: 0.7 MG/DL (ref 0.1–1)
BUN SERPL-MCNC: 9 MG/DL (ref 6–20)
CALCIUM SERPL-MCNC: 9.1 MG/DL (ref 8.7–10.5)
CHLORIDE SERPL-SCNC: 107 MMOL/L (ref 95–110)
CHOLEST SERPL-MCNC: 181 MG/DL (ref 120–199)
CHOLEST/HDLC SERPL: 4.4 {RATIO} (ref 2–5)
CK SERPL-CCNC: 153 U/L (ref 20–200)
CO2 SERPL-SCNC: 27 MMOL/L (ref 23–29)
CREAT SERPL-MCNC: 0.9 MG/DL (ref 0.5–1.4)
GFR SERPLBLD CREATININE-BSD FMLA CKD-EPI: >60 ML/MIN/1.73/M2
GLUCOSE SERPL-MCNC: 86 MG/DL (ref 70–110)
HDLC SERPL-MCNC: 41 MG/DL (ref 40–75)
HDLC SERPL: 22.7 % (ref 20–50)
LDLC SERPL CALC-MCNC: 117.8 MG/DL (ref 63–159)
NONHDLC SERPL-MCNC: 140 MG/DL
POTASSIUM SERPL-SCNC: 4.4 MMOL/L (ref 3.5–5.1)
PROT SERPL-MCNC: 7 GM/DL (ref 6–8.4)
SODIUM SERPL-SCNC: 140 MMOL/L (ref 136–145)
TRIGL SERPL-MCNC: 111 MG/DL (ref 30–150)

## 2025-04-17 PROCEDURE — 82550 ASSAY OF CK (CPK): CPT

## 2025-04-17 PROCEDURE — 36415 COLL VENOUS BLD VENIPUNCTURE: CPT

## 2025-04-17 PROCEDURE — 83718 ASSAY OF LIPOPROTEIN: CPT

## 2025-04-17 PROCEDURE — 82435 ASSAY OF BLOOD CHLORIDE: CPT

## 2025-04-21 DIAGNOSIS — R07.9 CHEST PAIN, UNSPECIFIED TYPE: Primary | ICD-10-CM

## 2025-04-22 ENCOUNTER — HOSPITAL ENCOUNTER (OUTPATIENT)
Dept: CARDIOLOGY | Facility: HOSPITAL | Age: 35
Discharge: HOME | End: 2025-04-22
Attending: INTERNAL MEDICINE
Payer: COMMERCIAL

## 2025-04-22 ENCOUNTER — OFFICE VISIT (OUTPATIENT)
Dept: CARDIOLOGY | Facility: CLINIC | Age: 35
End: 2025-04-22
Payer: COMMERCIAL

## 2025-04-22 VITALS
DIASTOLIC BLOOD PRESSURE: 73 MMHG | SYSTOLIC BLOOD PRESSURE: 128 MMHG | HEART RATE: 75 BPM | HEIGHT: 67 IN | OXYGEN SATURATION: 97 % | RESPIRATION RATE: 16 BRPM | BODY MASS INDEX: 36.14 KG/M2 | WEIGHT: 230.25 LBS

## 2025-04-22 DIAGNOSIS — E78.49 OTHER HYPERLIPIDEMIA: ICD-10-CM

## 2025-04-22 DIAGNOSIS — Q23.81 BICUSPID AORTIC VALVE: ICD-10-CM

## 2025-04-22 DIAGNOSIS — E04.1 RIGHT THYROID NODULE: ICD-10-CM

## 2025-04-22 DIAGNOSIS — R07.9 CHEST PAIN, UNSPECIFIED TYPE: ICD-10-CM

## 2025-04-22 DIAGNOSIS — I35.1 NONRHEUMATIC AORTIC VALVE INSUFFICIENCY: ICD-10-CM

## 2025-04-22 DIAGNOSIS — E66.01 SEVERE OBESITY (BMI 35.0-39.9) WITH COMORBIDITY: ICD-10-CM

## 2025-04-22 DIAGNOSIS — I35.8 OTHER NONRHEUMATIC AORTIC VALVE DISORDERS: ICD-10-CM

## 2025-04-22 DIAGNOSIS — I71.21 ANEURYSM OF ASCENDING AORTA WITHOUT RUPTURE: ICD-10-CM

## 2025-04-22 DIAGNOSIS — R07.9 CHEST PAIN, UNSPECIFIED TYPE: Primary | ICD-10-CM

## 2025-04-22 PROCEDURE — 3078F DIAST BP <80 MM HG: CPT | Mod: CPTII,S$GLB,, | Performed by: INTERNAL MEDICINE

## 2025-04-22 PROCEDURE — 1159F MED LIST DOCD IN RCRD: CPT | Mod: CPTII,S$GLB,, | Performed by: INTERNAL MEDICINE

## 2025-04-22 PROCEDURE — 93005 ELECTROCARDIOGRAM TRACING: CPT

## 2025-04-22 PROCEDURE — G2211 COMPLEX E/M VISIT ADD ON: HCPCS | Mod: S$GLB,,, | Performed by: INTERNAL MEDICINE

## 2025-04-22 PROCEDURE — 1160F RVW MEDS BY RX/DR IN RCRD: CPT | Mod: CPTII,S$GLB,, | Performed by: INTERNAL MEDICINE

## 2025-04-22 PROCEDURE — 3008F BODY MASS INDEX DOCD: CPT | Mod: CPTII,S$GLB,, | Performed by: INTERNAL MEDICINE

## 2025-04-22 PROCEDURE — 99999 PR PBB SHADOW E&M-EST. PATIENT-LVL III: CPT | Mod: PBBFAC,,, | Performed by: INTERNAL MEDICINE

## 2025-04-22 PROCEDURE — 99214 OFFICE O/P EST MOD 30 MIN: CPT | Mod: S$GLB,,, | Performed by: INTERNAL MEDICINE

## 2025-04-22 PROCEDURE — 3074F SYST BP LT 130 MM HG: CPT | Mod: CPTII,S$GLB,, | Performed by: INTERNAL MEDICINE

## 2025-04-22 PROCEDURE — 93010 ELECTROCARDIOGRAM REPORT: CPT | Mod: ,,, | Performed by: INTERNAL MEDICINE

## 2025-04-22 RX ORDER — ATORVASTATIN CALCIUM 20 MG/1
20 TABLET, FILM COATED ORAL NIGHTLY
Qty: 90 TABLET | Refills: 1 | Status: SHIPPED | OUTPATIENT
Start: 2025-04-22 | End: 2026-04-22

## 2025-04-22 RX ORDER — METOPROLOL SUCCINATE 25 MG/1
12.5 TABLET, EXTENDED RELEASE ORAL NIGHTLY
Qty: 45 TABLET | Refills: 3 | Status: SHIPPED | OUTPATIENT
Start: 2025-04-22 | End: 2026-04-22

## 2025-04-22 NOTE — PROGRESS NOTES
Subjective:   Patient ID:  Toni Prado is a 35 y.o. male who presents for cardiac consult of No chief complaint on file.        HPI  The patient came in today for cardiac consult of No chief complaint on file.          Toni Prado is a 35 y.o. male pt with bicuspid AV with mod AI, mild aortic root dilation, obesity, HLD presents for CV follow up.         10/10/24  From pt - I have been having right sided chest pain since yesterday afternoon. There is a specific spot on the right side of my chest that hurts to the touch. I dont have trouble breathing nor do I feel any irregularities in my heart rate or BP. I wanted to send this to make sure you dont think I should get checked out due to my history. I feel as if it could be something muscular.     Recent CT chest - Stable dilatation of the ascending thoracic aorta at 4.4 cm.  9/2024; thyroid nodule noted  He has CP - more tender to touch. He did take Ibprofen - which is helping.     BP mildly elevated. HR 70s. BMI 35 - 228 lbs   ECG - NSR, LVH, nonsp ST changes - stable    1/16/25  BP and HR stable. BMI 37 - 237 lbs     Component Ref Range & Units 2 mo ago 4 yr ago   Cholesterol 100 - 199 mg/dL 224 High  186   Triglycerides 0 - 149 mg/dL 172 High  108   HDL >39 mg/dL 44 41   VLDL Cholesterol Dominic 5 - 40 mg/dL 31 20   LDL Calculated 0 - 99 mg/dL 149 High  125 High        4/22/25  BP and HR stable. BMI 36 - 230 lbs  Lipids improved 4/2025.     ECG 0 NSR, LVH, nonsp changes      Manager at [employer].     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 Normal wall thickness. There is mild eccentric hypertrophy. Normal wall motion. There is normal systolic function with a visually estimated ejection fraction of 55 - 60%. There is normal diastolic function.    Right Ventricle: Normal right ventricular cavity size.  Wall thickness is normal. Right ventricle wall motion  is normal. Systolic function is normal.    Aortic Valve: The aortic valve is likely bicuspid valve. There is mild aortic regurgitation with an eccentrically directed jet.  The degree of AI may be underestimated due to eccentric character.  Consider MUNA for further evaluation if clinically indicated.    Mitral Valve: There is mild regurgitation.    Tricuspid Valve: There is mild regurgitation.    Pulmonary Artery: There is mild pulmonary hypertension. The estimated pulmonary artery systolic pressure is 39 mmHg.    IVC/SVC: Normal venous pressure at 3 mmHg.      Results for orders placed during the hospital encounter of 12/12/22    Echo    Interpretation Summary  · The left ventricle is normal in size with concentric remodeling and normal systolic function.  · The estimated ejection fraction is 55%.  · Normal left ventricular diastolic function.  · Normal right ventricular size with normal right ventricular systolic function.  · Moderate aortic regurgitation.  · Mild tricuspid regurgitation.  · Intermediate central venous pressure (8 mmHg).  · The estimated PA systolic pressure is 27 mmHg.  · The sinuses of Valsalva is mildly dilated.  · The ascending aorta is mildly dilated.      Impression:  Unchanged mild fusiform aneurysmal dilatation of the ascending thoracic aorta measuring up to 4.2 cm in diameter.     Electronically signed by: Ravindra Araiza MD  Date:                                            12/20/2021    Results for orders placed during the hospital encounter of 01/06/22    Echo    Interpretation Summary  · The aortic valve is bileaflet.  · Moderate aortic regurgitation.  · The ascending aorta is mildly dilated.  · Normal systolic function.  · The estimated ejection fraction is 55%.  · Normal left ventricular diastolic function.  · With normal right ventricular systolic function.  · Normal central venous pressure (3 mmHg).  · The estimated PA systolic  "pressure is 26 mmHg.      Past Medical History:   Diagnosis Date    Heart murmur     History of vasectomy 03/20/2021    Stenosis     Valvular regurgitation     BAV-- MOD AR.       History reviewed. No pertinent surgical history.    Social History     Tobacco Use    Smoking status: Never    Smokeless tobacco: Never   Substance Use Topics    Alcohol use: Yes     Comment: occassionally    Drug use: No       Family History   Problem Relation Name Age of Onset    Hypertension Father         Patient's Medications   New Prescriptions    No medications on file   Previous Medications    AMOXICILLIN-CLAVULANATE 875-125MG (AUGMENTIN) 875-125 MG PER TABLET    Take 1 tablet by mouth 2 (two) times daily.    ATORVASTATIN (LIPITOR) 20 MG TABLET    Take 1 tablet (20 mg total) by mouth every evening.   Modified Medications    No medications on file   Discontinued Medications    No medications on file       Review of Systems   Constitutional: Negative.    HENT: Negative.     Respiratory: Negative.     Cardiovascular: Negative.    Gastrointestinal:  Positive for abdominal pain and heartburn. Negative for nausea.   Genitourinary: Negative.    Musculoskeletal: Negative.    Skin: Negative.    Neurological: Negative.    Endo/Heme/Allergies: Negative.    Psychiatric/Behavioral: Negative.         Wt Readings from Last 3 Encounters:   04/22/25 104.5 kg (230 lb 4.3 oz)   01/16/25 107.6 kg (237 lb 1.7 oz)   10/21/24 105.4 kg (232 lb 6.4 oz)     Temp Readings from Last 3 Encounters:   05/31/24 (!) 100.9 °F (38.3 °C) (Oral)   04/11/23 98.4 °F (36.9 °C) (Oral)   09/19/22 97.5 °F (36.4 °C) (Temporal)     BP Readings from Last 3 Encounters:   04/22/25 128/73   01/16/25 123/76   10/21/24 135/78     Pulse Readings from Last 3 Encounters:   04/22/25 75   01/16/25 76   10/21/24 85       /73   Pulse 75   Resp 16   Ht 5' 7" (1.702 m)   Wt 104.5 kg (230 lb 4.3 oz)   SpO2 97%   BMI 36.07 kg/m²     Objective:   Physical Exam  Vitals and nursing " note reviewed.   Constitutional:       General: He is not in acute distress.     Appearance: He is well-developed. He is not diaphoretic.   HENT:      Head: Normocephalic and atraumatic.      Nose: Nose normal.   Eyes:      General: No scleral icterus.     Conjunctiva/sclera: Conjunctivae normal.   Neck:      Thyroid: No thyromegaly.      Vascular: No JVD.   Cardiovascular:      Rate and Rhythm: Normal rate and regular rhythm.      Heart sounds: S1 normal and S2 normal. Murmur heard.      No friction rub. No gallop. No S3 or S4 sounds.   Pulmonary:      Effort: Pulmonary effort is normal. No respiratory distress.      Breath sounds: Normal breath sounds. No stridor. No wheezing or rales.   Chest:      Chest wall: No tenderness.   Abdominal:      General: Bowel sounds are normal. There is no distension.      Palpations: Abdomen is soft. There is no mass.      Tenderness: There is no abdominal tenderness. There is no rebound.   Genitourinary:     Comments: Deferred  Musculoskeletal:         General: No tenderness or deformity. Normal range of motion.      Cervical back: Normal range of motion and neck supple.   Lymphadenopathy:      Cervical: No cervical adenopathy.   Skin:     General: Skin is warm and dry.      Coloration: Skin is not pale.      Findings: No erythema or rash.   Neurological:      Mental Status: He is alert and oriented to person, place, and time.      Motor: No abnormal muscle tone.      Coordination: Coordination normal.   Psychiatric:         Behavior: Behavior normal.         Thought Content: Thought content normal.         Judgment: Judgment normal.         Lab Results   Component Value Date     04/17/2025     10/31/2024    K 4.4 04/17/2025    K 4.5 10/31/2024     04/17/2025     10/31/2024    CO2 27 04/17/2025    CO2 23 10/31/2024    BUN 9 04/17/2025    CREATININE 0.9 04/17/2025    GLU 90 10/31/2024    AST 28 04/17/2025    AST 34 10/31/2024    ALT 42 04/17/2025    ALT 40  10/31/2024    ALBUMIN 4.1 04/17/2025    ALBUMIN 4.3 04/11/2023    PROT 7.3 04/11/2023    BILITOT 0.7 04/17/2025    BILITOT 0.8 10/31/2024    WBC 8.22 04/11/2023    HGB 15.0 04/11/2023    HCT 44.4 04/11/2023    MCV 82 04/11/2023     04/11/2023    INR 1.0 12/24/2016    TSH 1.120 10/31/2024    CHOL 181 04/17/2025    CHOL 224 (H) 10/31/2024    HDL 41 04/17/2025    LDLCALC 149 (H) 10/31/2024    TRIG 111 04/17/2025    TRIG 172 (H) 10/31/2024    BNP <10 12/24/2016     Assessment:      1. Chest pain, unspecified type    2. Severe obesity (BMI 35.0-39.9) with comorbidity    3. Other hyperlipidemia    4. Bicuspid aortic valve    5. Nonrheumatic aortic valve insufficiency    6. Aneurysm of ascending aorta without rupture    7. BMI 38.0-38.9,adult    8. Other nonrheumatic aortic valve disorders    9. Right thyroid nodule          Plan:     1. Bicuspid AV with AI - mild to moderate  - cont to monitor  - ECHO 12/2022 with normal bi V function, moderate AI, mildly dilated asc aorta/sinus of Valsalva.   - ECHO 2/2024 with normal bi V function, bicuspid AV with mild AI, mild TR, mild MR, PASP 39 mmHg.   - cont Toprol 12.5 mg QHS - restart if not taking, if pulse is below 55 - will stop     2. TAA  - Stable dilatation of the ascending thoracic aorta at 4.4 cm.  9/2024  - will repeat in 2025 - ordered today - for Sept/Oct 2025    3. Obesity - BMI 38 --> 37; fatty liver, BMI 38 - 244 lbs.  BMI 37 - 237 lbs   BMI 36 - 230 lbs  - cont weight loss with diet/exercise   -is off Ozempic     4. ED  - cont tx PRN     5. Chest pain   - atypical, improving  - stable ECG     6. Thyroid nodule - noted on CT scan  - thyroid u/s- had cysts   - will discuss ENT eval     7. HLD  - cont Lipitor 20mg QHS  - Lipids improved 4/2025.     Visit today included increased complexity associated with the care of the episodic problem chest pain addressed and managing the longitudinal care of the patient due to the serious and/or complex managed problem(s)  .      Thank you for allowing me to participate in this patient's care. Please do not hesitate to contact me with any questions or concerns. Consult note has been forwarded to the referral physician.

## 2025-04-23 LAB
OHS QRS DURATION: 102 MS
OHS QTC CALCULATION: 432 MS

## 2025-06-05 ENCOUNTER — OFFICE VISIT (OUTPATIENT)
Dept: URGENT CARE | Facility: CLINIC | Age: 35
End: 2025-06-05
Payer: COMMERCIAL

## 2025-06-05 VITALS
DIASTOLIC BLOOD PRESSURE: 76 MMHG | WEIGHT: 226.75 LBS | OXYGEN SATURATION: 98 % | RESPIRATION RATE: 18 BRPM | TEMPERATURE: 98 F | SYSTOLIC BLOOD PRESSURE: 121 MMHG | HEART RATE: 72 BPM | BODY MASS INDEX: 35.51 KG/M2

## 2025-06-05 DIAGNOSIS — H66.92 LEFT OTITIS MEDIA, UNSPECIFIED OTITIS MEDIA TYPE: Primary | ICD-10-CM

## 2025-06-05 RX ORDER — AMOXICILLIN 500 MG/1
500 TABLET, FILM COATED ORAL EVERY 12 HOURS
Qty: 14 TABLET | Refills: 0 | Status: SHIPPED | OUTPATIENT
Start: 2025-06-05 | End: 2025-06-12

## 2025-06-05 RX ORDER — METOPROLOL SUCCINATE 25 MG/1
12.5 TABLET, EXTENDED RELEASE ORAL NIGHTLY
Qty: 45 TABLET | Refills: 3 | Status: SHIPPED | OUTPATIENT
Start: 2025-06-05 | End: 2026-06-05

## 2025-06-05 RX ORDER — ATORVASTATIN CALCIUM 20 MG/1
20 TABLET, FILM COATED ORAL NIGHTLY
Qty: 90 TABLET | Refills: 1 | Status: SHIPPED | OUTPATIENT
Start: 2025-06-05 | End: 2026-06-05